# Patient Record
Sex: MALE | Race: BLACK OR AFRICAN AMERICAN | NOT HISPANIC OR LATINO | Employment: FULL TIME | URBAN - METROPOLITAN AREA
[De-identification: names, ages, dates, MRNs, and addresses within clinical notes are randomized per-mention and may not be internally consistent; named-entity substitution may affect disease eponyms.]

---

## 2017-01-06 ENCOUNTER — OFFICE VISIT (OUTPATIENT)
Dept: URGENT CARE | Facility: URGENT CARE | Age: 42
End: 2017-01-06
Payer: COMMERCIAL

## 2017-01-06 VITALS
OXYGEN SATURATION: 97 % | HEIGHT: 76 IN | DIASTOLIC BLOOD PRESSURE: 68 MMHG | HEART RATE: 78 BPM | WEIGHT: 202.6 LBS | TEMPERATURE: 98.2 F | BODY MASS INDEX: 24.67 KG/M2 | SYSTOLIC BLOOD PRESSURE: 104 MMHG

## 2017-01-06 DIAGNOSIS — H10.33 ACUTE CONJUNCTIVITIS OF BOTH EYES, UNSPECIFIED ACUTE CONJUNCTIVITIS TYPE: ICD-10-CM

## 2017-01-06 DIAGNOSIS — J06.9 UPPER RESPIRATORY TRACT INFECTION, UNSPECIFIED TYPE: Primary | ICD-10-CM

## 2017-01-06 PROCEDURE — 99213 OFFICE O/P EST LOW 20 MIN: CPT | Performed by: PHYSICIAN ASSISTANT

## 2017-01-06 RX ORDER — TOBRAMYCIN 3 MG/ML
1 SOLUTION/ DROPS OPHTHALMIC EVERY 4 HOURS
Qty: 1 BOTTLE | Refills: 0 | Status: SHIPPED | OUTPATIENT
Start: 2017-01-06 | End: 2017-01-13

## 2017-01-06 NOTE — PROGRESS NOTES
"SUBJECTIVE:   Sandra Cates is a 41 year old male presenting with a chief complaint of rhinorrhea.  Onset of symptoms was 2 day(s) ago.  Course of illness is same.    Severity mild  Current and Associated symptoms: eye redness and mattering  Treatment measures tried include none.  Predisposing factors include none.    Past Medical History   Diagnosis Date     Lactose intolerance      Thyroid disease         Allergies   Allergen Reactions     No Known Allergies      Milk Protein Extract Hives and Rash         Social History   Substance Use Topics     Smoking status: Current Every Day Smoker     Smokeless tobacco: Never Used      Comment: 1/2015 using Chantix, last cigarette 1st week of January 2015     Alcohol Use: No       ROS:  CONSTITUTIONAL:NEGATIVE for fever, chills, change in weight  INTEGUMENTARY/SKIN: NEGATIVE for worrisome rashes, moles or lesions  EYE: Positive for eye redness and mattering  ENT/MOUTH: POSITIVE for runny nose  RESP:NEGATIVE for significant cough or SOB  CV: NEGATIVE for chest pain, palpitations or peripheral edema  NEURO: NEGATIVE for weakness, dizziness or paresthesias    OBJECTIVE  :/68 mmHg  Pulse 78  Temp(Src) 98.2  F (36.8  C) (Oral)  Ht 6' 4\" (1.93 m)  Wt 202 lb 9.6 oz (91.899 kg)  BMI 24.67 kg/m2  SpO2 97%  GENERAL APPEARANCE: healthy, alert and no distress  EYES: Positive for bilateral eye redness with mild mattering  HENT: TM's normal bilaterally and rhinorrhea clear  NECK: supple, nontender, no lymphadenopathy  RESP: lungs clear to auscultation - no rales, rhonchi or wheezes  CV: regular rates and rhythm, normal S1 S2, no murmur noted  NEURO: Normal strength and tone, sensory exam grossly normal,  normal speech and mentation  SKIN: no suspicious lesions or rashes    ASSESSMENT/PLAN:      ICD-10-CM    1. Upper respiratory tract infection, unspecified type J06.9 tobramycin (TOBREX) 0.3 % ophthalmic solution   2. Acute conjunctivitis of both eyes, unspecified acute " conjunctivitis type H10.33 tobramycin (TOBREX) 0.3 % ophthalmic solution     Wash hands  Follow up with PCP as needed

## 2017-01-06 NOTE — NURSING NOTE
"Chief Complaint   Patient presents with     Conjunctivitis     both eyes, pink, crust on the eyes 1x days        Initial /68 mmHg  Pulse 78  Temp(Src) 98.2  F (36.8  C) (Oral)  Ht 6' 4\" (1.93 m)  Wt 202 lb 9.6 oz (91.899 kg)  BMI 24.67 kg/m2  SpO2 97% Estimated body mass index is 24.67 kg/(m^2) as calculated from the following:    Height as of this encounter: 6' 4\" (1.93 m).    Weight as of this encounter: 202 lb 9.6 oz (91.899 kg).  BP completed using cuff size: large      "

## 2017-03-26 DIAGNOSIS — E06.3 HASHIMOTO'S THYROIDITIS: ICD-10-CM

## 2017-03-27 RX ORDER — LEVOTHYROXINE SODIUM 88 UG/1
TABLET ORAL
Qty: 90 TABLET | Refills: 1 | Status: SHIPPED | OUTPATIENT
Start: 2017-03-27 | End: 2017-08-28

## 2017-03-27 NOTE — TELEPHONE ENCOUNTER
Prescription approved per [facility] Refill Protocol.  Noelle Ruano RN

## 2017-03-27 NOTE — TELEPHONE ENCOUNTER
levothyroxine (SYNTHROID/LEVOTHROID) 88 MCG tablet       Last Written Prescription Date: 8/1/2016  Last Quantity: 90, # refills: 1  Last Office Visit with [facility], [facility] or [facility] prescribing provider: 8/1/2016        TSH   Date Value Ref Range Status   08/01/2016 0.56 0.40 - 4.00 mU/L Final

## 2017-04-25 ENCOUNTER — OFFICE VISIT (OUTPATIENT)
Dept: FAMILY MEDICINE | Facility: CLINIC | Age: 42
End: 2017-04-25
Payer: COMMERCIAL

## 2017-04-25 VITALS
TEMPERATURE: 98.9 F | WEIGHT: 214.6 LBS | HEIGHT: 76 IN | SYSTOLIC BLOOD PRESSURE: 127 MMHG | BODY MASS INDEX: 26.13 KG/M2 | DIASTOLIC BLOOD PRESSURE: 71 MMHG | HEART RATE: 73 BPM | OXYGEN SATURATION: 97 %

## 2017-04-25 DIAGNOSIS — M54.16 ACUTE RADICULAR LOW BACK PAIN: Primary | ICD-10-CM

## 2017-04-25 DIAGNOSIS — R10.13 DYSPEPSIA: ICD-10-CM

## 2017-04-25 PROCEDURE — 99214 OFFICE O/P EST MOD 30 MIN: CPT | Performed by: INTERNAL MEDICINE

## 2017-04-25 RX ORDER — PREDNISONE 20 MG/1
20 TABLET ORAL DAILY
Qty: 5 TABLET | Refills: 0 | Status: SHIPPED | OUTPATIENT
Start: 2017-04-25 | End: 2017-08-28

## 2017-04-25 RX ORDER — TIZANIDINE 2 MG/1
2 TABLET ORAL 3 TIMES DAILY PRN
Qty: 30 TABLET | Refills: 1 | Status: SHIPPED | OUTPATIENT
Start: 2017-04-25 | End: 2017-08-28

## 2017-04-25 RX ORDER — NABUMETONE 500 MG/1
500 TABLET, FILM COATED ORAL 2 TIMES DAILY PRN
Qty: 30 TABLET | Refills: 1 | Status: SHIPPED | OUTPATIENT
Start: 2017-04-25 | End: 2017-08-28

## 2017-04-25 NOTE — MR AVS SNAPSHOT
After Visit Summary   4/25/2017    Sandra Cates    MRN: [id number]           Patient Information     Date Of Birth          1975        Visit Information        Provider Department      4/25/2017 3:30 PM Ericka Ortega MD [facility]        Today's Diagnoses     Acute radicular low back pain    -  1    Dyspepsia          Care Instructions    discontinue ibuprofen  Start taking nabumetone 500 mg twice daily with food  Take prednisone 20 mg daily for 5 days  Zantac 150 mg twice daily while you are on these medication to protect your stomach  Please call [place] radiology to schedule your MRI of back  [phone]  Seek sooner medical attention if there is any worsening of symptoms or problems.            Follow-ups after your visit        Future tests that were ordered for you today     Open Future Orders        Priority Expected Expires Ordered    MR Lumbar Spine w/o Contrast Routine  4/25/2018 4/25/2017            Who to contact     If you have questions or need follow up information about today's clinic visit or your schedule please contact [facility] directly at [phone].  Normal or non-critical lab and imaging results will be communicated to you by [employer], letter or phone within 4 business days after the clinic has received the results. If you do not hear from us within 7 days, please contact the clinic through [employer] or phone. If you have a critical or abnormal lab result, we will notify you by phone as soon as possible.  Submit refill requests through [employer] or call your pharmacy and they will forward the refill request to us. Please allow 3 business days for your refill to be completed.          Additional Information About Your Visit        [employer] Information     [employer] gives you secure access to your electronic health record. If you see a primary care provider, you can also send messages to your care team and make appointments. If you have  "questions, please call your primary care clinic.  If you do not have a primary care provider, please call [phone] and they will assist you.        Care EveryWhere ID     This is your Care EveryWhere ID. This could be used by other organizations to access your [place] medical records  [id number]        Your Vitals Were     Pulse Temperature Height Pulse Oximetry BMI (Body Mass Index)       73 98.9  F (37.2  C) (Oral) 6' 4\" (1.93 m) 97% 26.12 kg/m2        Blood Pressure from Last 3 Encounters:   04/25/17 127/71   01/06/17 104/68   08/01/16 124/72    Weight from Last 3 Encounters:   04/25/17 214 lb 9.6 oz (97.3 kg)   01/06/17 202 lb 9.6 oz (91.9 kg)   08/01/16 196 lb (88.9 kg)                 Today's Medication Changes          These changes are accurate as of: 4/25/17  3:59 PM.  If you have any questions, ask your nurse or doctor.               Start taking these medicines.        Dose/Directions    nabumetone 500 MG tablet   Commonly known as:  RELAFEN   Used for:  Acute radicular low back pain   Started by:  Ericka Ortega MD        Dose:  500 mg   Take 1 tablet (500 mg) by mouth 2 times daily as needed for moderate pain   Quantity:  30 tablet   Refills:  1       predniSONE 20 MG tablet   Commonly known as:  DELTASONE   Used for:  Acute radicular low back pain   Started by:  Ericka Ortega MD        Dose:  20 mg   Take 1 tablet (20 mg) by mouth daily   Quantity:  5 tablet   Refills:  0       tiZANidine 2 MG tablet   Commonly known as:  ZANAFLEX   Used for:  Acute radicular low back pain   Started by:  Ericka Ortega MD        Dose:  2 mg   Take 1 tablet (2 mg) by mouth 3 times daily as needed for muscle spasms   Quantity:  30 tablet   Refills:  1         These medicines have changed or have updated prescriptions.        Dose/Directions    * ranitidine 150 MG tablet   Commonly known as:  ZANTAC   This may have changed:  Another medication with the same name was added. Make sure you understand how and " when to take each.   Used for:  Dyspepsia   Changed by:  Ericka Ortega MD        Dose:  150 mg   Take 1 tablet (150 mg) by mouth 2 times daily   Quantity:  180 tablet   Refills:  1       * ranitidine 150 MG tablet   Commonly known as:  ZANTAC   This may have changed:  You were already taking a medication with the same name, and this prescription was added. Make sure you understand how and when to take each.   Used for:  Dyspepsia   Changed by:  Ericka Ortega MD        Dose:  150 mg   Take 1 tablet (150 mg) by mouth 2 times daily   Quantity:  30 tablet   Refills:  1       * Notice:  This list has 2 medication(s) that are the same as other medications prescribed for you. Read the directions carefully, and ask your doctor or other care provider to review them with you.         Where to get your medicines      These medications were sent to [facility]/pharmacy #[id number] [place], [place] - [address]  [address] [address]     Phone:  [phone]     nabumetone 500 MG tablet    predniSONE 20 MG tablet    ranitidine 150 MG tablet    tiZANidine 2 MG tablet                Primary Care Provider Office Phone # Fax #    Ericka Ortega [phone]       [place] [address] [address] [address]        Thank you!     Thank you for choosing [facility]  for your care. Our goal is always to provide you with excellent care. Hearing back from our patients is one way we can continue to improve our services. Please take a few minutes to complete the written survey that you may receive in the mail after your visit with us. Thank you!             Your Updated Medication List - Protect others around you: Learn how to safely use, store and throw away your medicines at www.disposemymeds.org.          This list is accurate as of: 4/25/17  3:59 PM.  Always use your most recent med list.                   Brand Name Dispense Instructions for use     levothyroxine 88 MCG tablet    SYNTHROID/LEVOTHROID    90 tablet    TAKE 1 TABLET (88 MCG) BY MOUTH DAILY       nabumetone 500 MG tablet    RELAFEN    30 tablet    Take 1 tablet (500 mg) by mouth 2 times daily as needed for moderate pain       predniSONE 20 MG tablet    DELTASONE    5 tablet    Take 1 tablet (20 mg) by mouth daily       * ranitidine 150 MG tablet    ZANTAC    180 tablet    Take 1 tablet (150 mg) by mouth 2 times daily       * ranitidine 150 MG tablet    ZANTAC    30 tablet    Take 1 tablet (150 mg) by mouth 2 times daily       tiZANidine 2 MG tablet    ZANAFLEX    30 tablet    Take 1 tablet (2 mg) by mouth 3 times daily as needed for muscle spasms       * varenicline 1 MG tablet    CHANTIX    56 tablet    Take 1 tablet (1 mg) by mouth 2 times daily As maintenance after finishing starter roderick       * varenicline 0.5 MG X 11 & 1 MG X 42 tablet    CHANTIX STARTING MONTH RODERICK    53 tablet    Take 0.5 mg tab daily for 3 days, then 0.5 mg tab twice daily for 4 days, then 1 mg twice daily.       * Notice:  This list has 4 medication(s) that are the same as other medications prescribed for you. Read the directions carefully, and ask your doctor or other care provider to review them with you.

## 2017-04-25 NOTE — NURSING NOTE
"Chief Complaint   Patient presents with     Musculoskeletal Problem       Initial /71 (BP Location: Left arm, Patient Position: Chair, Cuff Size: Adult Large)  Pulse 73  Temp 98.9  F (37.2  C) (Oral)  Ht 6' 4\" (1.93 m)  Wt 214 lb 9.6 oz (97.3 kg)  SpO2 97%  BMI 26.12 kg/m2 Estimated body mass index is 26.12 kg/(m^2) as calculated from the following:    Height as of this encounter: 6' 4\" (1.93 m).    Weight as of this encounter: 214 lb 9.6 oz (97.3 kg).  Medication Reconciliation: complete   Rosalee Lr MA  "

## 2017-04-25 NOTE — PATIENT INSTRUCTIONS
discontinue ibuprofen  Start taking nabumetone 500 mg twice daily with food  Take prednisone 20 mg daily for 5 days  Zantac 150 mg twice daily while you are on these medication to protect your stomach  Please call [place] radiology to schedule your MRI of back  [phone]  Seek sooner medical attention if there is any worsening of symptoms or problems.  Follow up in one week
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
## 2017-04-27 ENCOUNTER — HOSPITAL ENCOUNTER (OUTPATIENT)
Dept: MRI IMAGING | Facility: CLINIC | Age: 42
Discharge: HOME | End: 2017-04-27
Attending: INTERNAL MEDICINE | Admitting: INTERNAL MEDICINE
Payer: COMMERCIAL

## 2017-04-27 DIAGNOSIS — M54.16 ACUTE RADICULAR LOW BACK PAIN: ICD-10-CM

## 2017-04-27 PROCEDURE — 72148 MRI LUMBAR SPINE W/O DYE: CPT

## 2017-04-27 NOTE — LETTER
4/28/2017     Sandra Birminghamu  [address]  [facility] [address]      Dear Sandra:    This is to inform you regarding your test result.    I spoke to you on phone but sending you a result note also.  Your MRI shows:  IMPRESSION: L4-L5 mild or early degenerative disc disease, diffuse  annular bulge or broad-based disc protrusion, and mild bilateral  foraminal stenosis    I have referred you to spine specialist   Please call the following number to make appointment :  All areas ~ [phone]    Dr.Nasima Shannon MD,FACP/Naomi DO, CMA

## 2017-04-28 ENCOUNTER — TELEPHONE (OUTPATIENT)
Dept: FAMILY MEDICINE | Facility: CLINIC | Age: 42
End: 2017-04-28

## 2017-04-28 DIAGNOSIS — M54.10 RADICULAR LOW BACK PAIN: Primary | ICD-10-CM

## 2017-04-28 NOTE — TELEPHONE ENCOUNTER
Spoke to  Patient and informed him with result of MRI   Referred to spine specialist  Phone number is provided.  He agreed to see spine specialist.  Dr.Nasima Shannon MD

## 2017-04-28 NOTE — PROGRESS NOTES
This is to inform you regarding your test result.    I spoke to you on phone but sending you a result note also.  Your MRI shows:  IMPRESSION: L4-L5 mild or early degenerative disc disease, diffuse  annular bulge or broad-based disc protrusion, and mild bilateral  foraminal stenosis    I have referred you to spine specialist   Please call the following number to make appointment :  All areas ~ [phone]    Dr.Nasima Shannon MD,FACP

## 2017-04-28 NOTE — TELEPHONE ENCOUNTER
Reason for Call:  Request for results:    Name of test or procedure: MRI    Date of test of procedure: 4/27/2017    Location of the test or procedure: [facility]    OK to leave the result message on voice mail or with a family member? YES    Phone number Patient can be reached at:  Home number on file [phone] (home)    Additional comments: Today please    Call taken on 4/28/2017 at 2:29 PM by Niki Jorge

## 2017-05-03 ENCOUNTER — OFFICE VISIT (OUTPATIENT)
Dept: NEUROSURGERY | Facility: CLINIC | Age: 42
End: 2017-05-03
Attending: PHYSICIAN ASSISTANT
Payer: COMMERCIAL

## 2017-05-03 VITALS
SYSTOLIC BLOOD PRESSURE: 137 MMHG | OXYGEN SATURATION: 98 % | HEIGHT: 77 IN | WEIGHT: 209 LBS | TEMPERATURE: 98.1 F | DIASTOLIC BLOOD PRESSURE: 89 MMHG | BODY MASS INDEX: 24.68 KG/M2 | HEART RATE: 73 BPM

## 2017-05-03 DIAGNOSIS — M54.16 LUMBAR RADICULOPATHY: Primary | ICD-10-CM

## 2017-05-03 PROCEDURE — 99211 OFF/OP EST MAY X REQ PHY/QHP: CPT | Performed by: PHYSICIAN ASSISTANT

## 2017-05-03 PROCEDURE — 99203 OFFICE O/P NEW LOW 30 MIN: CPT | Performed by: PHYSICIAN ASSISTANT

## 2017-05-03 NOTE — NURSING NOTE
"Sandra Cates is a 41 year old male who presents for:  Chief Complaint   Patient presents with     Neurologic Problem     referral from Dr. Ortega for lumbar radiculopathy        Initial Vitals:  There were no vitals taken for this visit. Estimated body mass index is 26.12 kg/(m^2) as calculated from the following:    Height as of 4/25/17: 6' 4\" (1.93 m).    Weight as of 4/25/17: 214 lb 9.6 oz (97.3 kg).. There is no height or weight on file to calculate BSA. BP completed using cuff size: regular  Data Unavailable    Do you feel safe in your environment?  Yes  Do you need any refills today? No    Nursing Comments: referral from Dr. Ortega for lumbar radiculopathy.  Patient rates his pain today as mid to low back and hips      5 min. nursing intake time  Nickie Mae CMA, AAS      Discharge plan:   See providers dictation  2 min. nursing discharge time  Nickie Mae CMA, AAS       "

## 2017-05-03 NOTE — PATIENT INSTRUCTIONS
Physical therapy and steroid injection ordered - they will contact you within 48 hours to schedule

## 2017-05-03 NOTE — MR AVS SNAPSHOT
After Visit Summary   5/3/2017    Sandra Cates    MRN: [id number]           Patient Information     Date Of Birth          1975        Visit Information        Provider Department      5/3/2017 11:25 AM Lidia Mcleod PA-C [facility] Neurosurgery [facility]        Today's Diagnoses     Lumbar radiculopathy    -  1      Care Instructions    Physical therapy and steroid injection ordered - they will contact you within 48 hours to schedule        Follow-ups after your visit        Additional Services     [facility] PT, HAND, AND CHIROPRACTIC REFERRAL       **This order will print in the [facility] Scheduling Office**    Physical Therapy, Hand Therapy and Chiropractic Care are available through:    *[facility]  *[facility]  *[facility]    Call one number to schedule at any of the above locations: [phone].    Your provider has referred you to: Physical Therapy at [facility] or [facility]    Indication/Reason for Referral: Low Back Pain  Onset of Illness: chronic  Therapy Orders: Evaluate and Treat  Special Programs: None  Special Request: None    Natalee Albright      Additional Comments for the Therapist or Chiropractor:     Please be aware that coverage of these services is subject to the terms and limitations of your health insurance plan.  Call member services at your health plan with any benefit or coverage questions.      Please bring the following to your appointment:    *Your personal calendar for scheduling future appointments  *Comfortable clothing                  Future tests that were ordered for you today     Open Future Orders        Priority Expected Expires Ordered    XR Lumbar Epidural Injection Incl Imaging Routine 5/3/2017 5/3/2018 5/3/2017            Who to contact     If you have questions or need follow up information about today's clinic visit or your schedule please contact [facility]UROSURG[facility] directly at  "[phone].  Normal or non-critical lab and imaging results will be communicated to you by MyChart, letter or phone within 4 business days after the clinic has received the results. If you do not hear from us within 7 days, please contact the clinic through [employer] or phone. If you have a critical or abnormal lab result, we will notify you by phone as soon as possible.  Submit refill requests through [employer] or call your pharmacy and they will forward the refill request to us. Please allow 3 business days for your refill to be completed.          Additional Information About Your Visit        [employer] Information     [employer] gives you secure access to your electronic health record. If you see a primary care provider, you can also send messages to your care team and make appointments. If you have questions, please call your primary care clinic.  If you do not have a primary care provider, please call [phone] and they will assist you.        Care EveryWhere ID     This is your Care EveryWhere ID. This could be used by other organizations to access your [facility] medical records  [id number]        Your Vitals Were     Pulse Temperature Height Pulse Oximetry BMI (Body Mass Index)       73 98.1  F (36.7  C) (Oral) 6' 5\" (1.956 m) 98% 24.78 kg/m2        Blood Pressure from Last 3 Encounters:   05/03/17 137/89   04/25/17 127/71   01/06/17 104/68    Weight from Last 3 Encounters:   05/03/17 209 lb (94.8 kg)   04/25/17 214 lb 9.6 oz (97.3 kg)   01/06/17 202 lb 9.6 oz (91.9 kg)              We Performed the Following     [facility] PT, HAND, AND CHIROPRACTIC REFERRAL        Primary Care Provider Office Phone # Fax #    Ericka Ortega [phone]       [facility]    [address]  [facility] [address]        Thank you!     Thank you for choosing [facility]  for your care. Our goal is always to provide you with excellent care. Hearing back from our " patients is one way we can continue to improve our services. Please take a few minutes to complete the written survey that you may receive in the mail after your visit with us. Thank you!             Your Updated Medication List - Protect others around you: Learn how to safely use, store and throw away your medicines at www.disposemymeds.org.          This list is accurate as of: 5/3/17 11:49 AM.  Always use your most recent med list.                   Brand Name Dispense Instructions for use    levothyroxine 88 MCG tablet    SYNTHROID/LEVOTHROID    90 tablet    TAKE 1 TABLET (88 MCG) BY MOUTH DAILY       nabumetone 500 MG tablet    RELAFEN    30 tablet    Take 1 tablet (500 mg) by mouth 2 times daily as needed for moderate pain       predniSONE 20 MG tablet    DELTASONE    5 tablet    Take 1 tablet (20 mg) by mouth daily       ranitidine 150 MG tablet    ZANTAC    30 tablet    Take 1 tablet (150 mg) by mouth 2 times daily       tiZANidine 2 MG tablet    ZANAFLEX    30 tablet    Take 1 tablet (2 mg) by mouth 3 times daily as needed for muscle spasms       * varenicline 1 MG tablet    CHANTIX    56 tablet    Take 1 tablet (1 mg) by mouth 2 times daily As maintenance after finishing starter roderick       * varenicline 0.5 MG X 11 & 1 MG X 42 tablet    CHANTIX STARTING MONTH RODERICK    53 tablet    Take 0.5 mg tab daily for 3 days, then 0.5 mg tab twice daily for 4 days, then 1 mg twice daily.       * Notice:  This list has 2 medication(s) that are the same as other medications prescribed for you. Read the directions carefully, and ask your doctor or other care provider to review them with you.

## 2017-08-28 ENCOUNTER — OFFICE VISIT (OUTPATIENT)
Dept: FAMILY MEDICINE | Facility: CLINIC | Age: 42
End: 2017-08-28
Payer: COMMERCIAL

## 2017-08-28 VITALS
OXYGEN SATURATION: 98 % | HEART RATE: 66 BPM | HEIGHT: 77 IN | WEIGHT: 215.4 LBS | TEMPERATURE: 97.7 F | DIASTOLIC BLOOD PRESSURE: 64 MMHG | BODY MASS INDEX: 25.43 KG/M2 | SYSTOLIC BLOOD PRESSURE: 127 MMHG

## 2017-08-28 DIAGNOSIS — Z00.00 ROUTINE HISTORY AND PHYSICAL EXAMINATION OF ADULT: Primary | ICD-10-CM

## 2017-08-28 DIAGNOSIS — E06.3 HASHIMOTO'S THYROIDITIS: ICD-10-CM

## 2017-08-28 DIAGNOSIS — R14.3 FLATULENCE, ERUCTATION, AND GAS PAIN: ICD-10-CM

## 2017-08-28 DIAGNOSIS — R14.1 FLATULENCE, ERUCTATION, AND GAS PAIN: ICD-10-CM

## 2017-08-28 DIAGNOSIS — R14.2 FLATULENCE, ERUCTATION, AND GAS PAIN: ICD-10-CM

## 2017-08-28 DIAGNOSIS — L98.9 SKIN LESION: ICD-10-CM

## 2017-08-28 DIAGNOSIS — R10.13 DYSPEPSIA: ICD-10-CM

## 2017-08-28 DIAGNOSIS — Z12.5 PROSTATE CANCER SCREENING: ICD-10-CM

## 2017-08-28 LAB
ERYTHROCYTE [DISTWIDTH] IN BLOOD BY AUTOMATED COUNT: 12.8 % (ref 10–15)
HCT VFR BLD AUTO: 40.1 % (ref 40–53)
HGB BLD-MCNC: 13.5 G/DL (ref 13.3–17.7)
MCH RBC QN AUTO: 30 PG (ref 26.5–33)
MCHC RBC AUTO-ENTMCNC: 33.7 G/DL (ref 31.5–36.5)
MCV RBC AUTO: 89 FL (ref 78–100)
PLATELET # BLD AUTO: 150 10E9/L (ref 150–450)
RBC # BLD AUTO: 4.5 10E12/L (ref 4.4–5.9)
WBC # BLD AUTO: 5.4 10E9/L (ref 4–11)

## 2017-08-28 PROCEDURE — 36415 COLL VENOUS BLD VENIPUNCTURE: CPT | Performed by: INTERNAL MEDICINE

## 2017-08-28 PROCEDURE — 99396 PREV VISIT EST AGE 40-64: CPT | Performed by: INTERNAL MEDICINE

## 2017-08-28 PROCEDURE — 80061 LIPID PANEL: CPT | Performed by: INTERNAL MEDICINE

## 2017-08-28 PROCEDURE — 80053 COMPREHEN METABOLIC PANEL: CPT | Performed by: INTERNAL MEDICINE

## 2017-08-28 PROCEDURE — G0103 PSA SCREENING: HCPCS | Performed by: INTERNAL MEDICINE

## 2017-08-28 PROCEDURE — 84443 ASSAY THYROID STIM HORMONE: CPT | Performed by: INTERNAL MEDICINE

## 2017-08-28 PROCEDURE — 83516 IMMUNOASSAY NONANTIBODY: CPT | Mod: 59 | Performed by: INTERNAL MEDICINE

## 2017-08-28 PROCEDURE — 83516 IMMUNOASSAY NONANTIBODY: CPT | Performed by: INTERNAL MEDICINE

## 2017-08-28 PROCEDURE — 85027 COMPLETE CBC AUTOMATED: CPT | Performed by: INTERNAL MEDICINE

## 2017-08-28 PROCEDURE — 84439 ASSAY OF FREE THYROXINE: CPT | Performed by: INTERNAL MEDICINE

## 2017-08-28 RX ORDER — LEVOTHYROXINE SODIUM 88 UG/1
88 TABLET ORAL DAILY
Qty: 90 TABLET | Refills: 1 | Status: SHIPPED | OUTPATIENT
Start: 2017-08-28 | End: 2017-08-30

## 2017-08-28 NOTE — NURSING NOTE
"Chief Complaint   Patient presents with     Physical        Initial /64 (BP Location: Left arm, Patient Position: Chair, Cuff Size: Adult Regular)  Pulse 66  Temp 97.7  F (36.5  C) (Tympanic)  Ht 6' 5\" (1.956 m)  Wt 215 lb 6.4 oz (97.7 kg)  SpO2 98%  BMI 25.54 kg/m2 Estimated body mass index is 25.54 kg/(m^2) as calculated from the following:    Height as of this encounter: 6' 5\" (1.956 m).    Weight as of this encounter: 215 lb 6.4 oz (97.7 kg)..    BP completed using cuff size: regular  MEDICATIONS REVIEWED  SOCIAL AND FAMILY HX REVIEWED  Brooklyn Washington CMA  "

## 2017-08-28 NOTE — PATIENT INSTRUCTIONS
Preventive Health Recommendations  Male Ages 40 to 49    Yearly exam:             See your health care provider every year in order to  o   Review health changes.   o   Discuss preventive care.    o   Review your medicines if your doctor has prescribed any.    You should be tested each year for STDs (sexually transmitted diseases) if you re at risk.     Have a cholesterol test every 5 years.     Have a colonoscopy (test for colon cancer) if someone in your family has had colon cancer or polyps before age 50.     After age 45, have a diabetes test (fasting glucose). If you are at risk for diabetes, you should have this test every 3 years.      Talk with your health care provider about whether or not a prostate cancer screening test (PSA) is right for you.    Shots: Get a flu shot each year. Get a tetanus shot every 10 years.     Nutrition:    Eat at least 5 servings of fruits and vegetables daily.     Eat whole-grain bread, whole-wheat pasta and brown rice instead of white grains and rice.     Talk to your provider about Calcium and Vitamin D.     Lifestyle    Exercise for at least 150 minutes a week (30 minutes a day, 5 days a week). This will help you control your weight and prevent disease.     Limit alcohol to one drink per day.     No smoking.     Wear sunscreen to prevent skin cancer.     See your dentist every six months for an exam and cleaning.     Labs today  Make appointment with GI and skin care clinic  Follow up in one year for physical   Seek sooner medical attention if there is any worsening of symptoms or problems.

## 2017-08-28 NOTE — MR AVS SNAPSHOT
After Visit Summary   8/28/2017    Sandra Cates    MRN: [id number]           Patient Information     Date Of Birth          1975        Visit Information        Provider Department      8/28/2017 4:00 PM Ericka Ortega MD [facility]        Today's Diagnoses     Routine history and physical examination of adult    -  1    Hashimoto's thyroiditis        Dyspepsia        Flatulence, eructation, and gas pain        Prostate cancer screening        Skin lesion          Care Instructions      Preventive Health Recommendations  Male Ages 40 to 49    Yearly exam:             See your health care provider every year in order to  o   Review health changes.   o   Discuss preventive care.    o   Review your medicines if your doctor has prescribed any.    You should be tested each year for STDs (sexually transmitted diseases) if you re at risk.     Have a cholesterol test every 5 years.     Have a colonoscopy (test for colon cancer) if someone in your family has had colon cancer or polyps before age 50.     After age 45, have a diabetes test (fasting glucose). If you are at risk for diabetes, you should have this test every 3 years.      Talk with your health care provider about whether or not a prostate cancer screening test (PSA) is right for you.    Shots: Get a flu shot each year. Get a tetanus shot every 10 years.     Nutrition:    Eat at least 5 servings of fruits and vegetables daily.     Eat whole-grain bread, whole-wheat pasta and brown rice instead of white grains and rice.     Talk to your provider about Calcium and Vitamin D.     Lifestyle    Exercise for at least 150 minutes a week (30 minutes a day, 5 days a week). This will help you control your weight and prevent disease.     Limit alcohol to one drink per day.     No smoking.     Wear sunscreen to prevent skin cancer.     See your dentist every six months for an exam and cleaning.     Labs today  Make appointment with GI  and skin care clinic  Follow up in one year for physical   Seek sooner medical attention if there is any worsening of symptoms or problems.               Follow-ups after your visit        Additional Services     GASTROENTEROLOGY ADULT REF CONSULT ONLY       Preferred Location: [facility] [phone]      Please be aware that coverage of these services is subject to the terms and limitations of your health insurance plan.  Call member services at your health plan with any benefit or coverage questions.  Any procedures must be performed at a [facility] facility OR coordinated by your clinic's referral office.    Please bring the following with you to your appointment:    (1) Any X-Rays, CTs or MRIs which have been performed.  Contact the facility where they were done to arrange for  prior to your scheduled appointment.    (2) List of current medications   (3) This referral request   (4) Any documents/labs given to you for this referral            SKIN CARE REFERRAL       Your provider has referred you to: [facility]: [facility] [place] [phone]   [email or web address]     Please be aware that coverage of these services is subject to the terms and limitations of your health insurance plan.  Please check with your insurance prior to the appointment to ensure appropriate coverage for any services considered cosmetic in nature or not medically necessary.    Please bring the following with you to your appointment:    (1) Any X-Rays, CTs or MRIs which have been performed.  Contact the facility where they were done to arrange for  prior to your scheduled appointment.  Any new CT, MRI or other procedures ordered by your specialist must be performed at a [facility] facility or coordinated by your clinic's referral office.  (2) List of current medications  (3) This referral request   (4) Any documents/labs given to you for this referral                  Who to  "contact     If you have questions or need follow up information about today's clinic visit or your schedule please contact [facility] directly at [phone].  Normal or non-critical lab and imaging results will be communicated to you by MyChart, letter or phone within 4 business days after the clinic has received the results. If you do not hear from us within 7 days, please contact the clinic through [employer] or phone. If you have a critical or abnormal lab result, we will notify you by phone as soon as possible.  Submit refill requests through [employer] or call your pharmacy and they will forward the refill request to us. Please allow 3 business days for your refill to be completed.          Additional Information About Your Visit        [employer] Information     [employer] gives you secure access to your electronic health record. If you see a primary care provider, you can also send messages to your care team and make appointments. If you have questions, please call your primary care clinic.  If you do not have a primary care provider, please call [phone] and they will assist you.        Care EveryWhere ID     This is your Care EveryWhere ID. This could be used by other organizations to access your [facility] medical records  [id number]        Your Vitals Were     Pulse Temperature Height Pulse Oximetry BMI (Body Mass Index)       66 97.7  F (36.5  C) (Tympanic) 6' 5\" (1.956 m) 98% 25.54 kg/m2        Blood Pressure from Last 3 Encounters:   08/28/17 127/64   05/08/17 141/77   05/03/17 137/89    Weight from Last 3 Encounters:   08/28/17 215 lb 6.4 oz (97.7 kg)   05/03/17 209 lb (94.8 kg)   04/25/17 214 lb 9.6 oz (97.3 kg)              We Performed the Following     CBC with platelets     Comprehensive metabolic panel     GASTROENTEROLOGY ADULT REF CONSULT ONLY     Lipid panel reflex to direct LDL     PSA, screen     SKIN CARE REFERRAL     Tissue transglutaminase torsten IgA and IgG     TSH with free T4 " reflex          Today's Medication Changes          These changes are accurate as of: 8/28/17  4:39 PM.  If you have any questions, ask your nurse or doctor.               These medicines have changed or have updated prescriptions.        Dose/Directions    levothyroxine 88 MCG tablet   Commonly known as:  SYNTHROID/LEVOTHROID   This may have changed:  See the new instructions.   Used for:  Hashimoto's thyroiditis   Changed by:  Ericka Ortega MD        Dose:  88 mcg   Take 1 tablet (88 mcg) by mouth daily   Quantity:  90 tablet   Refills:  1            Where to get your medicines      These medications were sent to [facility]/pharmacy #[id number] - [facility] [address] [facility]  [address] [place] [address]     Phone:  [phone]     levothyroxine 88 MCG tablet                Primary Care Provider Office Phone # Fax #    Ericka Ortega [phone] [address] [address]        Equal Access to Services     [facility] AH: Hadii aad ku hadasho Sojenise, waaxda luqadaha, qaybta kaalmada adeegyada, waxay idiin hayaan tolu bahena . So [facility] [phone].    ATENCIÓN: Si habla español, tiene a fuller disposición servicios gratuitos de asistencia lingüística. Llame al [phone].    We comply with applicable federal civil rights laws and [place] laws. We do not discriminate on the basis of race, color, national origin, age, disability sex, sexual orientation or gender identity.            Thank you!     Thank you for choosing [facility]  for your care. Our goal is always to provide you with excellent care. Hearing back from our patients is one way we can continue to improve our services. Please take a few minutes to complete the written survey that you may receive in the mail after your visit with us. Thank you!             Your Updated Medication List - Protect others around you: Learn how to safely use, store and throw away your  medicines at www.disposemymeds.org.          This list is accurate as of: 8/28/17  4:39 PM.  Always use your most recent med list.                   Brand Name Dispense Instructions for use Diagnosis    levothyroxine 88 MCG tablet    SYNTHROID/LEVOTHROID    90 tablet    Take 1 tablet (88 mcg) by mouth daily    Hashimoto's thyroiditis       varenicline 1 MG tablet    CHANTIX    56 tablet    Take 1 tablet (1 mg) by mouth 2 times daily As maintenance after finishing starter shakeel    Tobacco use disorder

## 2017-08-28 NOTE — PROGRESS NOTES
SUBJECTIVE:   CC: Sandra Cates is an 41 year old male who presents for preventative health visit.     Healthy Habits:    Do you get at least three servings of calcium containing foods daily (dairy, green leafy vegetables, etc.)? yes    Amount of exercise or daily activities, outside of work: 2 day(s) per week    Problems taking medications regularly No    Medication side effects: No    Have you had an eye exam in the past two years? no    Do you see a dentist twice per year? no    Do you have sleep apnea, excessive snoring or daytime drowsiness?no        Today's PHQ-2 Score:   PHQ-2 ( 1999 [employer]) 8/28/2017 4/25/2017   Q1: Little interest or pleasure in doing things 0 0   Q2: Feeling down, depressed or hopeless 0 0   PHQ-2 Score 0 0     Pt says he experiences abdominal pain at least 3 days a week along with bloating-feels like he needs to keep Pepto-Bismol wherever he goes due to the frequency and severity   Reports that his abdominal pain has been going on for a very long time and he would like to see a GI specialist-pt took Zantac in the past, but he stopped taking it when he did not notice a significant relief of his symptoms  Pt no longer eats eggs, milk, and some sauces, but he still experiences symptoms with this  Says he does not have symptoms related to the prostate  Pt would like to be referred to skin doctor       Abuse: Current or Past(Physical, Sexual or Emotional)- No  Do you feel safe in your environment - Yes  Social History   Substance Use Topics     Smoking status: Former Smoker     Smokeless tobacco: Never Used      Comment: 1/2015 using Chantix, last cigarette 1st week of January 2015     Alcohol use No     The patient does not drink >3 drinks per day nor >7 drinks per week.    Last PSA:   PSA   Date Value Ref Range Status   08/01/2016 1.06 0 - 4 ug/L Final     Comment:     Assay Method:  Chemiluminescence using Siemens Vista analyzer       Reviewed orders with patient. Reviewed health  maintenance and updated orders accordingly - Yes  Labs reviewed in EPIC  Patient Active Problem List   Diagnosis     CARDIOVASCULAR SCREENING; LDL GOAL LESS THAN 160     Lactose intolerance     Elevated liver enzymes     Hashimoto's thyroiditis     FH: prostate cancer     Tear of medial cartilage or meniscus of knee, current     Anti-TPO antibodies present     Hypothyroidism, unspecified hypothyroidism type     No past surgical history on file.    Social History   Substance Use Topics     Smoking status: Former Smoker     Smokeless tobacco: Never Used      Comment: 1/2015 using Chantix, last cigarette 1st week of January 2015     Alcohol use No     Family History   Problem Relation Age of Onset     Coronary Artery Disease Mother      Prostate Cancer Father          Current Outpatient Prescriptions   Medication Sig Dispense Refill     levothyroxine (SYNTHROID/LEVOTHROID) 88 MCG tablet Take 1 tablet (88 mcg) by mouth daily 90 tablet 1     varenicline (CHANTIX) 1 MG tablet Take 1 tablet (1 mg) by mouth 2 times daily As maintenance after finishing starter shakeel 56 tablet 2     [DISCONTINUED] levothyroxine (SYNTHROID/LEVOTHROID) 88 MCG tablet TAKE 1 TABLET (88 MCG) BY MOUTH DAILY 90 tablet 1     Allergies   Allergen Reactions     No Known Allergies      Milk Protein Extract Hives and Rash         Reviewed and updated as needed this visit by clinical staff  Tobacco  Allergies  Meds  Soc Hx        Reviewed and updated as needed this visit by Provider              ROS:  C: NEGATIVE for fever, chills, change in weight  I: NEGATIVE for worrisome rashes, moles or lesions  E: NEGATIVE for vision changes or irritation  ENT: NEGATIVE for ear, mouth and throat problems  R: NEGATIVE for significant cough or SOB  CV: NEGATIVE for chest pain, palpitations or peripheral edema  GI: see HPI   male: negative for dysuria, hematuria, decreased urinary stream, erectile dysfunction, urethral discharge  M: NEGATIVE for significant  "arthralgias or myalgia  N: NEGATIVE for weakness, dizziness or paresthesias  P: NEGATIVE for changes in mood or affect  Back pain improved after epidural steroid injection.  This document serves as a record of the services and decisions personally performed and made by Ericka Ortega MD. It was created on her behalf by Lisa Ivey, a trained medical scribe. The creation of this document is based the provider's statements to the medical scribe.    Scribe Lisa Ivey 5:04 PM, August 28, 2017      OBJECTIVE:   /64 (BP Location: Left arm, Patient Position: Chair, Cuff Size: Adult Regular)  Pulse 66  Temp 97.7  F (36.5  C) (Tympanic)  Ht 6' 5\" (1.956 m)  Wt 215 lb 6.4 oz (97.7 kg)  SpO2 98%  BMI 25.54 kg/m2  EXAM:  GENERAL: healthy, alert and no distress  EYES: Eyes grossly normal to inspection, PERRL and conjunctivae and sclerae normal  HENT: ear canals and TM's normal, nose and mouth without ulcers or lesions  NECK: no adenopathy, no asymmetry, masses, or scars and thyroid normal to palpation  RESP: lungs clear to auscultation - no rales, rhonchi or wheezes  CV: regular rate and rhythm, normal S1 S2, no S3 or S4, no murmur, click or rub, no peripheral edema and peripheral pulses strong  ABDOMEN: soft, nontender, no hepatosplenomegaly, no masses and bowel sounds normal  : Normal external genitalia without lesions  MS: no gross musculoskeletal defects noted, no edema  SKIN: pt has healing lesions on lower extremities from playing soccer, also has small skin lesion on right upper arm on lateral side  Like skin tag.  NEURO: Normal strength and tone, mentation intact and speech normal  PSYCH: mentation appears normal, affect normal/bright    ASSESSMENT/PLAN:     Sandra was seen today for physical.    Diagnoses and all orders for this visit:    Routine history and physical examination of adult  -     Lipid panel reflex to direct LDL  -     CBC with platelets    Hashimoto's thyroiditis  -     " "levothyroxine (SYNTHROID/LEVOTHROID) 88 MCG tablet; Take 1 tablet (88 mcg) by mouth daily  -     TSH with free T4 reflex  Educated pt about the importance of healthy diet and exercise     Dyspepsia  -     Comprehensive metabolic panel  -     GASTROENTEROLOGY ADULT REF CONSULT ONLY  -     Tissue transglutaminase torsten IgA and IgG  Continue taking Pepto-Bismol as needed and take zantac 150 mg twice daily.    Flatulence, eructation, and gas pain  -     Comprehensive metabolic panel  -     GASTROENTEROLOGY ADULT REF CONSULT ONLY  -     Tissue transglutaminase torsten IgA and IgG    Prostate cancer screening  -     PSA, screen    Skin lesion  Comments:  on right upper arm is irritating and comes under clothes  Orders:  -     SKIN CARE REFERRAL for removal of skin tag which is irritated.      See pt instructions    Patient Instructions     Labs today  Make appointment with GI and skin care clinic  Follow up in one year for physical   Seek sooner medical attention if there is any worsening of symptoms or problems.         COUNSELING:  Reviewed preventive health counseling, as reflected in patient instructions       Regular exercise       Healthy diet/nutrition       reports that he has quit smoking. He has never used smokeless tobacco.      Estimated body mass index is 25.54 kg/(m^2) as calculated from the following:    Height as of this encounter: 6' 5\" (1.956 m).    Weight as of this encounter: 215 lb 6.4 oz (97.7 kg).   Weight management plan: healthy diet and excercise    Counseling Resources:  ATP IV Guidelines  Pooled Cohorts Equation Calculator  FRAX Risk Assessment  ICSI Preventive Guidelines  Dietary Guidelines for Americans, 2010  USDA's MyPlate  ASA Prophylaxis  Lung CA Screening    The information in this document, created by the medical scribe for me, accurately reflects the services I personally performed and the decisions made by me. I have reviewed and approved this document for accuracy prior to leaving the " patient care area.  Ericka Ortega MD  5:03 PM, 08/28/17      Ericka Ortega MD  [facility]

## 2017-08-29 LAB
ALBUMIN SERPL-MCNC: 4 G/DL (ref 3.4–5)
ALP SERPL-CCNC: 67 U/L (ref 40–150)
ALT SERPL W P-5'-P-CCNC: 54 U/L (ref 0–70)
ANION GAP SERPL CALCULATED.3IONS-SCNC: 6 MMOL/L (ref 3–14)
AST SERPL W P-5'-P-CCNC: 45 U/L (ref 0–45)
BILIRUB SERPL-MCNC: 0.6 MG/DL (ref 0.2–1.3)
BUN SERPL-MCNC: 23 MG/DL (ref 7–30)
CALCIUM SERPL-MCNC: 8.9 MG/DL (ref 8.5–10.1)
CHLORIDE SERPL-SCNC: 105 MMOL/L (ref 94–109)
CHOLEST SERPL-MCNC: 186 MG/DL
CO2 SERPL-SCNC: 30 MMOL/L (ref 20–32)
CREAT SERPL-MCNC: 0.98 MG/DL (ref 0.66–1.25)
GFR SERPL CREATININE-BSD FRML MDRD: 84 ML/MIN/1.7M2
GLUCOSE SERPL-MCNC: 106 MG/DL (ref 70–99)
HDLC SERPL-MCNC: 34 MG/DL
LDLC SERPL CALC-MCNC: 116 MG/DL
NONHDLC SERPL-MCNC: 152 MG/DL
POTASSIUM SERPL-SCNC: 4 MMOL/L (ref 3.4–5.3)
PROT SERPL-MCNC: 7.2 G/DL (ref 6.8–8.8)
PSA SERPL-ACNC: 0.73 UG/L (ref 0–4)
SODIUM SERPL-SCNC: 141 MMOL/L (ref 133–144)
T4 FREE SERPL-MCNC: 0.7 NG/DL (ref 0.76–1.46)
TRIGL SERPL-MCNC: 180 MG/DL
TSH SERPL DL<=0.005 MIU/L-ACNC: 41.91 MU/L (ref 0.4–4)

## 2017-08-30 DIAGNOSIS — E06.3 HASHIMOTO'S THYROIDITIS: ICD-10-CM

## 2017-08-30 LAB
TTG IGA SER-ACNC: 1 U/ML
TTG IGG SER-ACNC: <1 U/ML

## 2017-08-30 RX ORDER — LEVOTHYROXINE SODIUM 112 UG/1
112 TABLET ORAL DAILY
Qty: 90 TABLET | Refills: 0 | Status: SHIPPED | OUTPATIENT
Start: 2017-08-30 | End: 2017-11-25

## 2017-08-30 NOTE — PROGRESS NOTES
Kelton Flores,    This is to inform you regarding your test result.    I spoke to you on phone but sending you a result note also.  TSH which is thyroid hormone is elevated   Increase levothyroxine to 112  mcg po daily  Recheck TSH in 2 months.  Orders are placed.  New script is sent  Make lab only appointment in 2 months for  checking TSH level  Your total cholesterol is normal.  The triglycerides are high. Lowering  the amount of sugar ,alcohol and sweets in the diet helps to control this.Exercise and weight loss helps.  HDL which is called good cholesterol is low.  Your LDL which is called bad cholesterol is elevated.  Eat low cholesterol low fat  diet and do regular physical activity. Avoid high sugar containing food.  The testing of your kidney function, liver function and electrolytes was satisfactory   CBC result which includes white count Hemoglobin and  Platelet Counts is normal.   Your PSA (prostate cancer screening test)  is normal.          Dr.Nasima Shannon MD,FACP

## 2017-09-10 ENCOUNTER — OFFICE VISIT (OUTPATIENT)
Dept: URGENT CARE | Facility: URGENT CARE | Age: 42
End: 2017-09-10
Payer: COMMERCIAL

## 2017-09-10 VITALS
WEIGHT: 213 LBS | HEART RATE: 59 BPM | TEMPERATURE: 97.6 F | BODY MASS INDEX: 25.26 KG/M2 | DIASTOLIC BLOOD PRESSURE: 72 MMHG | SYSTOLIC BLOOD PRESSURE: 143 MMHG

## 2017-09-10 DIAGNOSIS — L29.9 ITCHING: ICD-10-CM

## 2017-09-10 DIAGNOSIS — L21.0 PITYRIASIS: Primary | ICD-10-CM

## 2017-09-10 PROCEDURE — 99213 OFFICE O/P EST LOW 20 MIN: CPT | Performed by: PHYSICIAN ASSISTANT

## 2017-09-10 RX ORDER — CETIRIZINE HYDROCHLORIDE 10 MG/1
10 TABLET ORAL EVERY EVENING
Qty: 30 TABLET | Refills: 1 | Status: SHIPPED | OUTPATIENT
Start: 2017-09-10 | End: 2018-11-27

## 2017-09-10 RX ORDER — METHYLPREDNISOLONE 4 MG
TABLET, DOSE PACK ORAL
Qty: 21 TABLET | Refills: 0 | Status: SHIPPED | OUTPATIENT
Start: 2017-09-10 | End: 2017-12-22

## 2017-09-10 NOTE — NURSING NOTE
"Chief Complaint   Patient presents with     Derm Problem     itchy rash on various parts of body since yedterday.        Initial /72  Pulse 59  Temp 97.6  F (36.4  C) (Oral)  Wt 213 lb (96.6 kg)  BMI 25.26 kg/m2 Estimated body mass index is 25.26 kg/(m^2) as calculated from the following:    Height as of 8/28/17: 6' 5\" (1.956 m).    Weight as of this encounter: 213 lb (96.6 kg).  Medication Reconciliation: complete    "

## 2017-09-10 NOTE — PROGRESS NOTES
SUBJECTIVE:  Sandra Cates is a 41 year old male who presents to the clinic today for a rash, itching  Onset of rash was few day(s) ago.   Rash is still present.  Location of the rash: back, chest and arms.  Quality/symptoms of rash: itchy patches   Symptoms are mild and moderate and rash seems to be worsening.  Previous history of a similar rash? No  Recent exposure history: none known  Recent new medications: None  Associated symptoms include: itching.    Past Medical History:   Diagnosis Date     Lactose intolerance      Thyroid disease         Allergies   Allergen Reactions     No Known Allergies      Milk Protein Extract Hives and Rash     Social History   Substance Use Topics     Smoking status: Former Smoker     Smokeless tobacco: Never Used      Comment: 1/2015 using Chantix, last cigarette 1st week of January 2015     Alcohol use No       ROS:  CONSTITUTIONAL:NEGATIVE for fever, chills, change in weight  INTEGUMENTARY/SKIN: POSITIVE for itching   ENT/MOUTH: NEGATIVE for ear, mouth and throat problems  RESP:NEGATIVE for significant cough or SOB  CV: NEGATIVE for chest pain, palpitations or peripheral edema  GI: NEGATIVE for nausea, abdominal pain, heartburn, or change in bowel habits  MUSCULOSKELETAL: NEGATIVE for significant arthralgias or myalgia  NEURO: NEGATIVE for weakness, dizziness or paresthesias    EXAM:   /72  Pulse 59  Temp 97.6  F (36.4  C) (Oral)  Wt 213 lb (96.6 kg)  BMI 25.26 kg/m2  GENERAL: alert, no acute distress.  SKIN: Rash description:    Distribution: generalized  Location: back chest and arms    Color: skin color,  Lesion type: patchy, isolated with itching  RESP: lungs clear to auscultation - no rales, rhonchi or wheezes  CV: regular rates and rhythm, normal S1 S2, no murmur noted  MS:  extremities normal- no gross deformities noted, no erythema, FROM noted in all extremities  NEURO: Normal strength and tone, sensory exam grossly normal,  normal speech and  mentation    ASSESSMENT/PLAN:    ICD-10-CM    1. Pityriasis L21.0    2. Itching L29.9 methylPREDNISolone (MEDROL DOSEPAK) 4 MG tablet     cetirizine (ZYRTEC) 10 MG tablet           1) See today's orders.  2) Follow-up with primary clinic if not improving

## 2017-09-10 NOTE — MR AVS SNAPSHOT
After Visit Summary   9/10/2017    Sandra Cates    MRN: [id number]           Patient Information     Date Of Birth          1975        Visit Information        Provider Department      9/10/2017 1:50 PM Jamey Alejandro PA-C [facility]        Today's Diagnoses     Pityriasis    -  1    Itching           Follow-ups after your visit        Who to contact     If you have questions or need follow up information about today's clinic visit or your schedule please contact [facility] directly at [phone].  Normal or non-critical lab and imaging results will be communicated to you by MyChart, letter or phone within 4 business days after the clinic has received the results. If you do not hear from us within 7 days, please contact the clinic through [employer] or phone. If you have a critical or abnormal lab result, we will notify you by phone as soon as possible.  Submit refill requests through [employer] or call your pharmacy and they will forward the refill request to us. Please allow 3 business days for your refill to be completed.          Additional Information About Your Visit        MyChart Information     [employer] gives you secure access to your electronic health record. If you see a primary care provider, you can also send messages to your care team and make appointments. If you have questions, please call your primary care clinic.  If you do not have a primary care provider, please call [phone] and they will assist you.        Care EveryWhere ID     This is your Care EveryWhere ID. This could be used by other organizations to access your [place] medical records  [id number]        Your Vitals Were     Pulse Temperature BMI (Body Mass Index)             59 97.6  F (36.4  C) (Oral) 25.26 kg/m2          Blood Pressure from Last 3 Encounters:   09/10/17 143/72   08/28/17 127/64   05/08/17 141/77    Weight from Last 3 Encounters:    09/10/17 213 lb (96.6 kg)   08/28/17 215 lb 6.4 oz (97.7 kg)   05/03/17 209 lb (94.8 kg)              Today, you had the following     No orders found for display         Today's Medication Changes          These changes are accurate as of: 9/10/17  2:37 PM.  If you have any questions, ask your nurse or doctor.               Start taking these medicines.        Dose/Directions    cetirizine 10 MG tablet   Commonly known as:  zyrTEC   Used for:  Itching   Started by:  Jamey Alejandro PA-C        Dose:  10 mg   Take 1 tablet (10 mg) by mouth every evening   Quantity:  30 tablet   Refills:  1       methylPREDNISolone 4 MG tablet   Commonly known as:  MEDROL DOSEPAK   Used for:  Itching   Started by:  Jamey Alejandro PA-C        Follow package instructions   Quantity:  21 tablet   Refills:  0            Where to get your medicines      These medications were sent to [facility]/pharmacy #[id number] [address] [facility]  [address] [place] [id number]     Phone:  [phone]     cetirizine 10 MG tablet    methylPREDNISolone 4 MG tablet                Primary Care Provider Office Phone # Fax #    Ericka Ortega [phone] [address] [address]        Equal Access to Services     ISABEL BENNETT AH: Hadii aad ku hadasho Sojenise, waaxda luqadaha, qaybta kaalmada adeegyada, sherri portilloin hayaan tolu bahena . So [facility] [phone].    ATENCIÓN: Si habla español, tiene a fuller disposición servicios gratuitos de asistencia lingüística. Nancy al [phone].    We comply with applicable federal civil rights laws and [place] laws. We do not discriminate on the basis of race, color, national origin, age, disability sex, sexual orientation or gender identity.            Thank you!     Thank you for choosing [facility]  for your care. Our goal is always to provide you with excellent care. Hearing back from our patients  is one way we can continue to improve our services. Please take a few minutes to complete the written survey that you may receive in the mail after your visit with us. Thank you!             Your Updated Medication List - Protect others around you: Learn how to safely use, store and throw away your medicines at www.disposemymeds.org.          This list is accurate as of: 9/10/17  2:37 PM.  Always use your most recent med list.                   Brand Name Dispense Instructions for use Diagnosis    cetirizine 10 MG tablet    zyrTEC    30 tablet    Take 1 tablet (10 mg) by mouth every evening    Itching       levothyroxine 112 MCG tablet    SYNTHROID/LEVOTHROID    90 tablet    Take 1 tablet (112 mcg) by mouth daily    Hashimoto's thyroiditis       methylPREDNISolone 4 MG tablet    MEDROL DOSEPAK    21 tablet    Follow package instructions    Itching       varenicline 1 MG tablet    CHANTIX    56 tablet    Take 1 tablet (1 mg) by mouth 2 times daily As maintenance after finishing starter shakeel    Tobacco use disorder

## 2017-09-21 ENCOUNTER — TRANSFERRED RECORDS (OUTPATIENT)
Dept: HEALTH INFORMATION MANAGEMENT | Facility: CLINIC | Age: 42
End: 2017-09-21

## 2017-09-24 ENCOUNTER — HOSPITAL ENCOUNTER (EMERGENCY)
Facility: CLINIC | Age: 42
Discharge: HOME | End: 2017-09-24
Attending: EMERGENCY MEDICINE | Admitting: EMERGENCY MEDICINE
Payer: COMMERCIAL

## 2017-09-24 ENCOUNTER — APPOINTMENT (OUTPATIENT)
Dept: GENERAL RADIOLOGY | Facility: CLINIC | Age: 42
End: 2017-09-24
Attending: PHYSICIAN ASSISTANT
Payer: COMMERCIAL

## 2017-09-24 VITALS
OXYGEN SATURATION: 97 % | DIASTOLIC BLOOD PRESSURE: 70 MMHG | BODY MASS INDEX: 25.15 KG/M2 | HEIGHT: 77 IN | TEMPERATURE: 97.7 F | HEART RATE: 70 BPM | SYSTOLIC BLOOD PRESSURE: 145 MMHG | WEIGHT: 213 LBS | RESPIRATION RATE: 16 BRPM

## 2017-09-24 DIAGNOSIS — M25.512 ACUTE PAIN OF LEFT SHOULDER: ICD-10-CM

## 2017-09-24 PROCEDURE — 73030 X-RAY EXAM OF SHOULDER: CPT | Mod: LT

## 2017-09-24 PROCEDURE — 99283 EMERGENCY DEPT VISIT LOW MDM: CPT

## 2017-09-24 ASSESSMENT — ENCOUNTER SYMPTOMS
HEADACHES: 0
NUMBNESS: 0
SHORTNESS OF BREATH: 0
WEAKNESS: 0
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
## 2017-09-24 NOTE — LETTER
To Whom it may concern:      Sandra Cates was seen in our Emergency Department today, 09/24/17.  I expect his condition to improve over the next 3 - 5 days.  He may return to work immediately, though should not use his left shoulder for lifting until pain is improved.    Sincerely,  Chad A. Trierweiler, MD
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
## 2017-09-24 NOTE — ED AVS SNAPSHOT
Emergency Department    [address] [place] [address]    Phone:  [phone]    Fax:  [phone]                                       Sandra Cates   MRN: [id number]    Department:   Emergency Department   Date of Visit:  9/24/2017           After Visit Summary Signature Page     I have received my discharge instructions, and my questions have been answered. I have discussed any challenges I see with this plan with the nurse or doctor.    ..........................................................................................................................................  Patient/Patient Representative Signature      ..........................................................................................................................................  Patient Representative Print Name and Relationship to Patient    ..................................................               ................................................  Date                                            Time    ..........................................................................................................................................  Reviewed by Signature/Title    ...................................................              ..............................................  Date                                                            Time

## 2017-11-25 DIAGNOSIS — E06.3 HASHIMOTO'S THYROIDITIS: ICD-10-CM

## 2017-11-25 NOTE — LETTER
[place] [address] [facility] [address]  Phone: [phone]        November 29, 2017      Sandra Darryn         [address] [facility] [address]            Dear Mr. Cates,    We are concerned about your health care.  We recently provided you with a medication refill.  Many medications require routine follow-up with your Doctor.      At this time we ask that: You schedule a lab appointment for a follow up Thyroid lab.     Your prescription: Has been refilled for 1 month so you may have time for the above noted follow-up.      Thank you,      Ericka Ortega MD/ kelly

## 2017-11-28 RX ORDER — LEVOTHYROXINE SODIUM 112 UG/1
112 TABLET ORAL DAILY
Qty: 30 TABLET | Refills: 0 | Status: SHIPPED | OUTPATIENT
Start: 2017-11-28 | End: 2017-12-22

## 2017-12-18 ENCOUNTER — OFFICE VISIT (OUTPATIENT)
Dept: URGENT CARE | Facility: URGENT CARE | Age: 42
End: 2017-12-18
Payer: COMMERCIAL

## 2017-12-18 VITALS
SYSTOLIC BLOOD PRESSURE: 123 MMHG | OXYGEN SATURATION: 97 % | TEMPERATURE: 99 F | WEIGHT: 211.9 LBS | BODY MASS INDEX: 25.13 KG/M2 | HEART RATE: 67 BPM | DIASTOLIC BLOOD PRESSURE: 66 MMHG

## 2017-12-18 DIAGNOSIS — M62.81 GENERALIZED MUSCLE WEAKNESS: ICD-10-CM

## 2017-12-18 DIAGNOSIS — R50.9 FEVER AND CHILLS: Primary | ICD-10-CM

## 2017-12-18 LAB
FLUAV+FLUBV AG SPEC QL: NEGATIVE
FLUAV+FLUBV AG SPEC QL: NEGATIVE
SPECIMEN SOURCE: NORMAL

## 2017-12-18 PROCEDURE — 99213 OFFICE O/P EST LOW 20 MIN: CPT | Performed by: FAMILY MEDICINE

## 2017-12-18 PROCEDURE — 87804 INFLUENZA ASSAY W/OPTIC: CPT | Performed by: FAMILY MEDICINE

## 2017-12-18 NOTE — MR AVS SNAPSHOT
After Visit Summary   12/18/2017    Sandra Cates    MRN: [id number]           Patient Information     Date Of Birth          1975        Visit Information        Provider Department      12/18/2017 2:55 PM Wai Bryan,  [facility]        Today's Diagnoses     Fever and chills    -  1    Generalized muscle weakness           Follow-ups after your visit        Your next 10 appointments already scheduled     Dec 22, 2017  3:00 PM CST   [facility]ga [facility]    [address] [facility] [address] [phone]           Bring a current list of meds and any records pertaining to this visit. For Physicals, please bring immunization records and any forms needing to be filled out. Please arrive 10 minutes early to complete paperwork.              Who to contact     If you have questions or need follow up information about today's clinic visit or your schedule please contact [facility] directly at [phone].  Normal or non-critical lab and imaging results will be communicated to you by [employer], letter or phone within 4 business days after the clinic has received the results. If you do not hear from us within 7 days, please contact the clinic through [employer] or phone. If you have a critical or abnormal lab result, we will notify you by phone as soon as possible.  Submit refill requests through [employer] or call your pharmacy and they will forward the refill request to us. Please allow 3 business days for your refill to be completed.          Additional Information About Your Visit        MyChart Information     [employer] gives you secure access to your electronic health record. If you see a primary care provider, you can also send messages to your care team and make appointments. If you have questions, please call your primary care clinic.  If you do not have a  primary care provider, please call [phone] and they will assist you.        Care EveryWhere ID     This is your Care EveryWhere ID. This could be used by other organizations to access your [facility] medical records  [id number]        Your Vitals Were     Pulse Temperature Pulse Oximetry BMI (Body Mass Index)          67 99  F (37.2  C) (Oral) 97% 25.13 kg/m2         Blood Pressure from Last 3 Encounters:   12/18/17 123/66   09/24/17 145/70   09/10/17 143/72    Weight from Last 3 Encounters:   12/18/17 211 lb 14.4 oz (96.1 kg)   09/24/17 213 lb (96.6 kg)   09/10/17 213 lb (96.6 kg)              We Performed the Following     Influenza A/B antigen        Primary Care Provider Office Phone # Fax #    Ericka R MD Shannon [phone] [address]  [place]                [place] [address]        Equal Access to Services     [facility]: Hadii aad ku hadasho Sotimoali, waaxda luqadaha, qaybta kaalmada adeegyada, waxay idiin hayaan kalpanaeg soni bahena . So [facility] [phone].    ATENCIÓN: Si habla español, tiene a fuller disposición servicios gratuitos de asistencia lingüística. Llame al [phone].    We comply with applicable federal civil rights laws and [place] laws. We do not discriminate on the basis of race, color, national origin, age, disability, sex, sexual orientation, or gender identity.            Thank you!     Thank you for choosing [facility]  for your care. Our goal is always to provide you with excellent care. Hearing back from our patients is one way we can continue to improve our services. Please take a few minutes to complete the written survey that you may receive in the mail after your visit with us. Thank you!             Your Updated Medication List - Protect others around you: Learn how to safely use, store and throw away your medicines at www.disposemymeds.org.          This list is accurate as of: 12/18/17  4:14 PM.  Always use your most  recent med list.                   Brand Name Dispense Instructions for use Diagnosis    cetirizine 10 MG tablet    zyrTEC    30 tablet    Take 1 tablet (10 mg) by mouth every evening    Itching       * levothyroxine 112 MCG tablet    SYNTHROID/LEVOTHROID    30 tablet    Take 1 tablet (112 mcg) by mouth daily Due for lab TSH please call the clinic and make lab appointment    Hashimoto's thyroiditis       * levothyroxine 112 MCG tablet    SYNTHROID/LEVOTHROID    30 tablet    Take 1 tablet (112 mcg) by mouth daily Due for labs    Hashimoto's thyroiditis       methylPREDNISolone 4 MG tablet    MEDROL DOSEPAK    21 tablet    Follow package instructions    Itching       varenicline 1 MG tablet    CHANTIX    56 tablet    Take 1 tablet (1 mg) by mouth 2 times daily As maintenance after finishing starter shakeel    Tobacco use disorder       * Notice:  This list has 2 medication(s) that are the same as other medications prescribed for you. Read the directions carefully, and ask your doctor or other care provider to review them with you.

## 2017-12-21 NOTE — PROGRESS NOTES
SUBJECTIVE:   Sandra Cates is a 42 year old male who presents to clinic today for the following health issues:      ED/UC Followup:    Facility:  [facility] urgent care  Date of visit: 12/18/17  Reason for visit: influenza A/B antigen  Current Status: OUT OF ER     Reports that he woke up on 12/16/17 feeling sick, and got worse  He went to ED to 12/18/17 and was tested negative for influenza  Feels better now    Problem list and histories reviewed & adjusted, as indicated.  Additional history: as documented    Patient Active Problem List   Diagnosis     CARDIOVASCULAR SCREENING; LDL GOAL LESS THAN 160     Lactose intolerance     Elevated liver enzymes     Hashimoto's thyroiditis     FH: prostate cancer     Tear of medial cartilage or meniscus of knee, current     Anti-TPO antibodies present     Hypothyroidism, unspecified hypothyroidism type     History reviewed. No pertinent surgical history.    Social History   Substance Use Topics     Smoking status: Former Smoker     Smokeless tobacco: Never Used      Comment: 1/2015 using Chantix, last cigarette 1st week of January 2015     Alcohol use No     Family History   Problem Relation Age of Onset     Coronary Artery Disease Mother      Prostate Cancer Father          Current Outpatient Prescriptions   Medication Sig Dispense Refill     levothyroxine (SYNTHROID/LEVOTHROID) 112 MCG tablet Take 1 tablet (112 mcg) by mouth daily 90 tablet 1     cetirizine (ZYRTEC) 10 MG tablet Take 1 tablet (10 mg) by mouth every evening 30 tablet 1     varenicline (CHANTIX) 1 MG tablet Take 1 tablet (1 mg) by mouth 2 times daily As maintenance after finishing starter shakeel 56 tablet 2     [DISCONTINUED] levothyroxine (SYNTHROID/LEVOTHROID) 112 MCG tablet Take 1 tablet (112 mcg) by mouth daily Due for labs 90 tablet 1     [DISCONTINUED] levothyroxine (SYNTHROID/LEVOTHROID) 112 MCG tablet Take 1 tablet (112 mcg) by mouth daily Due for labs 30 tablet 0     [DISCONTINUED]  "levothyroxine (SYNTHROID/LEVOTHROID) 112 MCG tablet Take 1 tablet (112 mcg) by mouth daily Due for lab TSH please call the clinic and make lab appointment 30 tablet 0     Allergies   Allergen Reactions     No Known Allergies      Milk Protein Extract Hives and Rash     Medications and Labs reviewed in EPIC      Reviewed and updated as needed this visit by clinical staff  Tobacco  Allergies  Meds  Problems  Med Hx  Surg Hx  Fam Hx  Soc Hx        Reviewed and updated as needed this visit by Provider         ROS:  Constitutional, HEENT, cardiovascular, pulmonary, GI, , musculoskeletal, neuro, skin, endocrine and psych systems are negative, except as otherwise noted.    This document serves as a record of the services and decisions personally performed and made by Ericka Ortega MD. It was created on her behalf by Vani Whitfield, a trained medical scribe. The creation of this document is based on the provider's statements to the medical scribe.  Vani Whitfield 2:57 PM December 22, 2017    OBJECTIVE:   /70  Pulse 63  Temp 97.8  F (36.6  C) (Oral)  Ht 1.956 m (6' 5\")  Wt 95.3 kg (210 lb)  SpO2 99%  BMI 24.9 kg/m2  Body mass index is 24.9 kg/(m^2).    GENERAL APPEARANCE: healthy, alert and no distress  EYES: Eyes grossly normal to inspection, PERRL and conjunctivae and sclerae normal  HENT: ear canals and TM's normal and nose and mouth without ulcers or lesions  NECK: no adenopathy  RESP: lungs clear to auscultation - no rales, rhonchi or wheezes  CV: regular rates and rhythm, normal S1 S2, no S3    Diagnostic Test Results:  No results found for this or any previous visit (from the past 24 hour(s)).    Reviewed and discussed TSH blood tests done on 08/28/17  Reviewed and discussed lipid profile done on 08/28/17    ASSESSMENT/PLAN:     Sandra was seen today for hospital f/u.    Diagnoses and all orders for this visit:    Hashimoto's thyroiditis  -     Discontinue: levothyroxine " (SYNTHROID/LEVOTHROID) 112 MCG tablet; Take 1 tablet (112 mcg) by mouth daily Due for labs  -     TSH with free T4 reflex  -     levothyroxine (SYNTHROID/LEVOTHROID) 112 MCG tablet; Take 1 tablet (112 mcg) by mouth daily  Follows up with gastroenterologist  Hasn't been taking pantoprazole   Reports that he hadn't been compliant with medication in the summer due to Jehovah's witness fasting  Patient endorses that he is taking levothyroxine consistently now  I ordered blood work today and will notify patient of his results via MyChart  If TSH levels aren't improving, I will increase his dose of levothyroxine    ED Follow Up  Patient came in today for an ED follow-up  He presented to the ED on 12/18/17 with flu-like symptoms but was tested negative for influenza  His symptoms have since subsided, and he is feeling fine    Patient Instructions   Labs today  I refilled your prescriptions  Follow up in 3 months  Schedule an early morning appointment and come fasting  Seek sooner medical attention if there is any worsening of symptoms or problems    The information in this document, created by the medical scribe for me, accurately reflects the services I personally performed and the decisions made by me. I have reviewed and approved this document for accuracy prior to leaving the patient care area.  December 22, 2017 3:09 PM    Ericka Ortega MD  [facility]

## 2017-12-22 ENCOUNTER — OFFICE VISIT (OUTPATIENT)
Dept: FAMILY MEDICINE | Facility: CLINIC | Age: 42
End: 2017-12-22
Payer: COMMERCIAL

## 2017-12-22 VITALS
TEMPERATURE: 97.8 F | OXYGEN SATURATION: 99 % | SYSTOLIC BLOOD PRESSURE: 125 MMHG | HEIGHT: 77 IN | BODY MASS INDEX: 24.79 KG/M2 | WEIGHT: 210 LBS | DIASTOLIC BLOOD PRESSURE: 70 MMHG | HEART RATE: 63 BPM

## 2017-12-22 DIAGNOSIS — E06.3 HASHIMOTO'S THYROIDITIS: Primary | ICD-10-CM

## 2017-12-22 PROCEDURE — 84443 ASSAY THYROID STIM HORMONE: CPT | Performed by: INTERNAL MEDICINE

## 2017-12-22 PROCEDURE — 99213 OFFICE O/P EST LOW 20 MIN: CPT | Performed by: INTERNAL MEDICINE

## 2017-12-22 PROCEDURE — 36415 COLL VENOUS BLD VENIPUNCTURE: CPT | Performed by: INTERNAL MEDICINE

## 2017-12-22 RX ORDER — LEVOTHYROXINE SODIUM 112 UG/1
112 TABLET ORAL DAILY
Qty: 90 TABLET | Refills: 1 | Status: SHIPPED | OUTPATIENT
Start: 2017-12-22 | End: 2017-12-22

## 2017-12-22 RX ORDER — LEVOTHYROXINE SODIUM 112 UG/1
112 TABLET ORAL DAILY
Qty: 90 TABLET | Refills: 1 | Status: SHIPPED | OUTPATIENT
Start: 2017-12-22 | End: 2018-06-28

## 2017-12-22 NOTE — PATIENT INSTRUCTIONS
Labs today  I refilled your prescriptions  Follow up in 3 months  Schedule an early morning appointment and come fasting  Seek sooner medical attention if there is any worsening of symptoms or problems

## 2017-12-22 NOTE — MR AVS SNAPSHOT
After Visit Summary   12/22/2017    Sandra Cates    MRN: [id number]           Patient Information     Date Of Birth          1975        Visit Information        Provider Department      12/22/2017 3:00 PM Ericka Ortega MD [facility]        Today's Diagnoses     Hashimoto's thyroiditis    -  1      Care Instructions    Labs today  I refilled your prescriptions  Follow up in 3 months  Schedule an early morning appointment and come fasting  Seek sooner medical attention if there is any worsening of symptoms or problems          Follow-ups after your visit        Who to contact     If you have questions or need follow up information about today's clinic visit or your schedule please contact [facility] directly at [phone].  Normal or non-critical lab and imaging results will be communicated to you by Medical Simulationhart, letter or phone within 4 business days after the clinic has received the results. If you do not hear from us within 7 days, please contact the clinic through Medical Simulationhart or phone. If you have a critical or abnormal lab result, we will notify you by phone as soon as possible.  Submit refill requests through Siasto or call your pharmacy and they will forward the refill request to us. Please allow 3 business days for your refill to be completed.          Additional Information About Your Visit        MyChart Information     Siasto gives you secure access to your electronic health record. If you see a primary care provider, you can also send messages to your care team and make appointments. If you have questions, please call your primary care clinic.  If you do not have a primary care provider, please call [phone] and they will assist you.        Care EveryWhere ID     This is your Care EveryWhere ID. This could be used by other organizations to access your [place] medical records  [id number]        Your Vitals Were     Pulse Temperature Height Pulse  "Oximetry BMI (Body Mass Index)       63 97.8  F (36.6  C) (Oral) 6' 5\" (1.956 m) 99% 24.9 kg/m2        Blood Pressure from Last 3 Encounters:   12/22/17 125/70   12/18/17 123/66   09/24/17 145/70    Weight from Last 3 Encounters:   12/22/17 210 lb (95.3 kg)   12/18/17 211 lb 14.4 oz (96.1 kg)   09/24/17 213 lb (96.6 kg)              We Performed the Following     TSH with free T4 reflex          Today's Medication Changes          These changes are accurate as of: 12/22/17  3:07 PM.  If you have any questions, ask your nurse or doctor.               Start taking these medicines.        Dose/Directions    levothyroxine 112 MCG tablet   Commonly known as:  SYNTHROID/LEVOTHROID   Used for:  Hashimoto's thyroiditis   Started by:  Ericka Ortega MD        Dose:  112 mcg   Take 1 tablet (112 mcg) by mouth daily   Quantity:  90 tablet   Refills:  1            Where to get your medicines      These medications were sent to [facility]/pharmacy #[id number] - [place] [id number] [address]     Phone:  [phone]     levothyroxine 112 MCG tablet                Primary Care Provider Office Phone # Fax #    Ericka Ortega [phone] [address] [address]        Equal Access to Services     ISABEL [facility] AH: Hadii reyes Prater, waaxda mili, qaybta kaalmada dank, sherri mcclendon. So [facility] [phone].    ATENCIÓN: Si habla milly, tiene a fuller disposición servicios gratuitos de asistencia lingüística. Llame al [phone].    We comply with applicable federal civil rights laws and [place] laws. We do not discriminate on the basis of race, color, national origin, age, disability, sex, sexual orientation, or gender identity.            Thank you!     Thank you for choosing [facility]  for your care. Our goal is always to provide you with excellent care. Hearing back from our " patients is one way we can continue to improve our services. Please take a few minutes to complete the written survey that you may receive in the mail after your visit with us. Thank you!             Your Updated Medication List - Protect others around you: Learn how to safely use, store and throw away your medicines at www.disposemymeds.org.          This list is accurate as of: 12/22/17  3:07 PM.  Always use your most recent med list.                   Brand Name Dispense Instructions for use Diagnosis    cetirizine 10 MG tablet    zyrTEC    30 tablet    Take 1 tablet (10 mg) by mouth every evening    Itching       levothyroxine 112 MCG tablet    SYNTHROID/LEVOTHROID    90 tablet    Take 1 tablet (112 mcg) by mouth daily    Hashimoto's thyroiditis       varenicline 1 MG tablet    CHANTIX    56 tablet    Take 1 tablet (1 mg) by mouth 2 times daily As maintenance after finishing starter shakeel    Tobacco use disorder

## 2017-12-22 NOTE — NURSING NOTE
"Chief Complaint   Patient presents with     Hospital F/U       Initial /70  Pulse 63  Temp 97.8  F (36.6  C) (Oral)  Ht 6' 5\" (1.956 m)  Wt 210 lb (95.3 kg)  SpO2 99%  BMI 24.9 kg/m2 Estimated body mass index is 24.9 kg/(m^2) as calculated from the following:    Height as of this encounter: 6' 5\" (1.956 m).    Weight as of this encounter: 210 lb (95.3 kg).  Medication Reconciliation: complete   Aimee Dhaliwal CMA      "

## 2017-12-23 LAB — TSH SERPL DL<=0.005 MIU/L-ACNC: 2.46 MU/L (ref 0.4–4)

## 2017-12-25 NOTE — PROGRESS NOTES
Kelton Flores,    This is to inform you regarding your test result.    TSH which is thyroid hormone is normal.  Stay on current dose of levothyroxine.  Recheck TSH in 6 months      Sincerely,      Dr.Nasima Shannon MD,FACP

## 2017-12-28 DIAGNOSIS — E06.3 HASHIMOTO'S THYROIDITIS: ICD-10-CM

## 2017-12-29 RX ORDER — LEVOTHYROXINE SODIUM 112 UG/1
TABLET ORAL
Start: 2017-12-29

## 2018-01-03 NOTE — PROGRESS NOTES
SUBJECTIVE: Sandra Cates is a 42 year old male presenting with a chief complaint of fever and weakness.  Onset of symptoms was day(s) ago.  Course of illness is same.    Severity moderate  Current and Associated symptoms: runny nose and cough - non-productive  Treatment measures tried include Tylenol/Ibuprofen.  Predisposing factors include None.    Past Medical History:   Diagnosis Date     Lactose intolerance      Thyroid disease      Allergies   Allergen Reactions     No Known Allergies      Milk Protein Extract Hives and Rash     Social History   Substance Use Topics     Smoking status: Former Smoker     Smokeless tobacco: Never Used      Comment: 1/2015 using Chantix, last cigarette 1st week of January 2015     Alcohol use No       ROS:  SKIN: no rash  GI: no vomiting    OBJECTIVE:  /66  Pulse 67  Temp 99  F (37.2  C) (Oral)  Wt 211 lb 14.4 oz (96.1 kg)  SpO2 97%  BMI 25.13 kg/m2   GENERAL APPEARANCE: healthy, alert and no distress  EYES: EOMI,  PERRL, conjunctiva clear  HENT: ear canals and TM's normal.  Nose and mouth without ulcers, erythema or lesions  NECK: supple, nontender, no lymphadenopathy  RESP: lungs clear to auscultation - no rales, rhonchi or wheezes  CV: regular rates and rhythm, normal S1 S2, no murmur noted  SKIN: no suspicious lesions or rashes      ICD-10-CM    1. Fever and chills R50.9 Influenza A/B antigen   2. Generalized muscle weakness M62.81        Fluids/Rest, f/u if worse/not any better

## 2018-03-16 ENCOUNTER — OFFICE VISIT (OUTPATIENT)
Dept: URGENT CARE | Facility: URGENT CARE | Age: 43
End: 2018-03-16
Payer: COMMERCIAL

## 2018-03-16 VITALS
WEIGHT: 204 LBS | RESPIRATION RATE: 16 BRPM | HEART RATE: 64 BPM | SYSTOLIC BLOOD PRESSURE: 114 MMHG | DIASTOLIC BLOOD PRESSURE: 66 MMHG | TEMPERATURE: 97.8 F | BODY MASS INDEX: 24.19 KG/M2

## 2018-03-16 DIAGNOSIS — L03.317 CELLULITIS OF BUTTOCK: ICD-10-CM

## 2018-03-16 DIAGNOSIS — L02.92 FURUNCLE OF SKIN OR SUBCUTANEOUS TISSUE: Primary | ICD-10-CM

## 2018-03-16 PROCEDURE — 99213 OFFICE O/P EST LOW 20 MIN: CPT | Performed by: PHYSICIAN ASSISTANT

## 2018-03-16 RX ORDER — CEPHALEXIN 500 MG/1
500 CAPSULE ORAL 3 TIMES DAILY
Qty: 30 CAPSULE | Refills: 0 | Status: SHIPPED | OUTPATIENT
Start: 2018-03-16 | End: 2018-10-12

## 2018-03-16 NOTE — NURSING NOTE
"Chief Complaint   Patient presents with     Derm Problem     lump upper lt thight area       Initial /66 (Cuff Size: Adult Large)  Pulse 64  Temp 97.8  F (36.6  C) (Oral)  Resp 16  Wt 204 lb (92.5 kg)  BMI 24.19 kg/m2 Estimated body mass index is 24.19 kg/(m^2) as calculated from the following:    Height as of 12/22/17: 6' 5\" (1.956 m).    Weight as of this encounter: 204 lb (92.5 kg).  Medication Reconciliation: complete Chacho HOPPER    "

## 2018-03-16 NOTE — MR AVS SNAPSHOT
After Visit Summary   3/16/2018    Sandra Cates    MRN: [id number]           Patient Information     Date Of Birth          1975        Visit Information        Provider Department      3/16/2018 9:05 AM Jamey Alejandro PA-C [facility]        Today's Diagnoses     Furuncle of skin or subcutaneous tissue    -  1    Cellulitis of buttock           Follow-ups after your visit        Who to contact     If you have questions or need follow up information about today's clinic visit or your schedule please contact [facility] directly at [phone].  Normal or non-critical lab and imaging results will be communicated to you by Quwan.comhart, letter or phone within 4 business days after the clinic has received the results. If you do not hear from us within 7 days, please contact the clinic through Quwan.comhart or phone. If you have a critical or abnormal lab result, we will notify you by phone as soon as possible.  Submit refill requests through Wisegate or call your pharmacy and they will forward the refill request to us. Please allow 3 business days for your refill to be completed.          Additional Information About Your Visit        MyChart Information     Wisegate gives you secure access to your electronic health record. If you see a primary care provider, you can also send messages to your care team and make appointments. If you have questions, please call your primary care clinic.  If you do not have a primary care provider, please call [phone] and they will assist you.        Care EveryWhere ID     This is your Care EveryWhere ID. This could be used by other organizations to access your [place] medical records  [id number]        Your Vitals Were     Pulse Temperature Respirations BMI (Body Mass Index)          64 97.8  F (36.6  C) (Oral) 16 24.19 kg/m2         Blood Pressure from Last 3 Encounters:   03/16/18 114/66   12/22/17 125/70    12/18/17 123/66    Weight from Last 3 Encounters:   03/16/18 204 lb (92.5 kg)   12/22/17 210 lb (95.3 kg)   12/18/17 211 lb 14.4 oz (96.1 kg)              Today, you had the following     No orders found for display         Today's Medication Changes          These changes are accurate as of 3/16/18 11:59 PM.  If you have any questions, ask your nurse or doctor.               Start taking these medicines.        Dose/Directions    cephALEXin 500 MG capsule   Commonly known as:  KEFLEX   Used for:  Furuncle of skin or subcutaneous tissue, Cellulitis of buttock   Started by:  Jamey Alejandro PA-C        Dose:  500 mg   Take 1 capsule (500 mg) by mouth 3 times daily   Quantity:  30 capsule   Refills:  0            Where to get your medicines      These medications were sent to [facility] [address] [place] [address] [address] [facility] [address]  [address] [facility] [address]    Hours:  24-hours Phone:  [phone]     cephALEXin 500 MG capsule                Primary Care Provider Office Phone # Fax #    Ericka Ortega [phone] [address] [address]        Equal Access to Services     SOHAIL BENNETT AH: Hadii aad ku hadasho Soomaali, waaxda luqadaha, qaybta kaalmada adeegyada, waxay idiin hayaan adelexie khsaurabh bahena . So [facility] [phone].    ATENCIÓN: Si habla español, tiene a fuller disposición servicios gratuitos de asistencia lingüística. Llame al [phone].    We comply with applicable federal civil rights laws and [place] laws. We do not discriminate on the basis of race, color, national origin, age, disability, sex, sexual orientation, or gender identity.            Thank you!     Thank you for choosing [facility]  for your care. Our goal is always to provide you with excellent care. Hearing back from our patients is one way we can continue to improve our services. Please take a few  minutes to complete the written survey that you may receive in the mail after your visit with us. Thank you!             Your Updated Medication List - Protect others around you: Learn how to safely use, store and throw away your medicines at www.disposemymeds.org.          This list is accurate as of 3/16/18 11:59 PM.  Always use your most recent med list.                   Brand Name Dispense Instructions for use Diagnosis    cephALEXin 500 MG capsule    KEFLEX    30 capsule    Take 1 capsule (500 mg) by mouth 3 times daily    Furuncle of skin or subcutaneous tissue, Cellulitis of buttock       cetirizine 10 MG tablet    zyrTEC    30 tablet    Take 1 tablet (10 mg) by mouth every evening    Itching       levothyroxine 112 MCG tablet    SYNTHROID/LEVOTHROID    90 tablet    Take 1 tablet (112 mcg) by mouth daily    Hashimoto's thyroiditis       varenicline 1 MG tablet    CHANTIX    56 tablet    Take 1 tablet (1 mg) by mouth 2 times daily As maintenance after finishing starter shakeel    Tobacco use disorder

## 2018-03-20 NOTE — PROGRESS NOTES
SUBJECTIVE:  Sandra Cates is a 42 year old male who presents to the clinic today for a rash.  Onset of rash was 1 week(s) ago.   Rash is still present.  Location of the rash: left groin, inguinal area.  Quality/symptoms of rash: hard, indurated erythematous   Symptoms are mild to moderate and rash seems to be worsening.  Previous history of a similar rash? yes  Recent exposure history: none  Recent new medications: none  Associated symptoms include: indurated and erythematous area.    Past Medical History:   Diagnosis Date     Lactose intolerance      Thyroid disease         Allergies   Allergen Reactions     No Known Allergies      Milk Protein Extract Hives and Rash     Social History   Substance Use Topics     Smoking status: Former Smoker     Smokeless tobacco: Never Used      Comment: 1/2015 using Chantix, last cigarette 1st week of January 2015     Alcohol use No       ROS:  CONSTITUTIONAL:NEGATIVE for fever, chills, change in weight  INTEGUMENTARY/SKIN: POSITIVE for left inguinal area tenderness swelling  RESP:NEGATIVE for significant cough or SOB  CV: NEGATIVE for chest pain, palpitations or peripheral edema  GI: NEGATIVE for nausea, abdominal pain, heartburn, or change in bowel habits  MUSCULOSKELETAL: NEGATIVE for significant arthralgias or myalgia  NEURO: NEGATIVE for weakness, dizziness or paresthesias    EXAM:   /66 (Cuff Size: Adult Large)  Pulse 64  Temp 97.8  F (36.6  C) (Oral)  Resp 16  Wt 204 lb (92.5 kg)  BMI 24.19 kg/m2  GENERAL: alert, no acute distress.  SKIN: Rash description:    Distribution: localized  Location: left groin    Color: erythematoud,  Lesion type: indurated localized area, isolated with inflammation  NECK: supple, non-tender to palpation, no adenopathy noted  RESP: lungs clear to auscultation - no rales, rhonchi or wheezes  CV: regular rates and rhythm, normal S1 S2, no murmur noted  NEURO: Normal strength and tone, sensory exam grossly normal,  normal speech and  mentation    ASSESSMENT/PLAN:    ICD-10-CM    1. Furuncle of skin or subcutaneous tissue L02.92 cephALEXin (KEFLEX) 500 MG capsule   2. Cellulitis of buttock L03.317 cephALEXin (KEFLEX) 500 MG capsule     Warm moist compresses  motrin      1) See today's orders.  2) Follow-up with primary clinic if not improving

## 2018-06-06 ENCOUNTER — OFFICE VISIT (OUTPATIENT)
Dept: URGENT CARE | Facility: URGENT CARE | Age: 43
End: 2018-06-06
Payer: COMMERCIAL

## 2018-06-06 VITALS
OXYGEN SATURATION: 97 % | RESPIRATION RATE: 12 BRPM | SYSTOLIC BLOOD PRESSURE: 119 MMHG | HEART RATE: 53 BPM | WEIGHT: 200.5 LBS | BODY MASS INDEX: 23.78 KG/M2 | TEMPERATURE: 98.1 F | DIASTOLIC BLOOD PRESSURE: 80 MMHG

## 2018-06-06 DIAGNOSIS — J01.90 ACUTE SINUSITIS WITH SYMPTOMS > 10 DAYS: Primary | ICD-10-CM

## 2018-06-06 PROCEDURE — 99213 OFFICE O/P EST LOW 20 MIN: CPT | Performed by: PHYSICIAN ASSISTANT

## 2018-06-06 RX ORDER — AZITHROMYCIN 250 MG/1
TABLET, FILM COATED ORAL
Qty: 6 TABLET | Refills: 0 | Status: SHIPPED | OUTPATIENT
Start: 2018-06-06 | End: 2018-10-12

## 2018-06-06 NOTE — PATIENT INSTRUCTIONS
"(J01.90) Acute sinusitis with symptoms > 10 days  (primary encounter diagnosis)  Comment:   Plan: azithromycin (ZITHROMAX) 250 MG tablet          Saline nasal spray \"ocean mist\"    Rest        "

## 2018-06-06 NOTE — MR AVS SNAPSHOT
"              After Visit Summary   6/6/2018    Sandra Cates    MRN: [id number]           Patient Information     Date Of Birth          1975        Visit Information        Provider Department      6/6/2018 1:05 PM Rebekah Osman PA-C [facility]        Today's Diagnoses     Acute sinusitis with symptoms > 10 days    -  1      Care Instructions    (J01.90) Acute sinusitis with symptoms > 10 days  (primary encounter diagnosis)  Comment:   Plan: azithromycin (ZITHROMAX) 250 MG tablet          Saline nasal spray \"ocean mist\"    Rest                Follow-ups after your visit        Who to contact     If you have questions or need follow up information about today's clinic visit or your schedule please contact [facility] directly at [phone].  Normal or non-critical lab and imaging results will be communicated to you by TEAM INTERVALhart, letter or phone within 4 business days after the clinic has received the results. If you do not hear from us within 7 days, please contact the clinic through TEAM INTERVALhart or phone. If you have a critical or abnormal lab result, we will notify you by phone as soon as possible.  Submit refill requests through Ceon or call your pharmacy and they will forward the refill request to us. Please allow 3 business days for your refill to be completed.          Additional Information About Your Visit        MyChart Information     Ceon gives you secure access to your electronic health record. If you see a primary care provider, you can also send messages to your care team and make appointments. If you have questions, please call your primary care clinic.  If you do not have a primary care provider, please call [phone] and they will assist you.        Care EveryWhere ID     This is your Care EveryWhere ID. This could be used by other organizations to access your [place] medical records  [id number]        Your " Vitals Were     Pulse Temperature Respirations Pulse Oximetry BMI (Body Mass Index)       53 98.1  F (36.7  C) (Oral) 12 97% 23.78 kg/m2        Blood Pressure from Last 3 Encounters:   06/06/18 119/80   03/16/18 114/66   12/22/17 125/70    Weight from Last 3 Encounters:   06/06/18 200 lb 8 oz (90.9 kg)   03/16/18 204 lb (92.5 kg)   12/22/17 210 lb (95.3 kg)              Today, you had the following     No orders found for display         Today's Medication Changes          These changes are accurate as of 6/6/18  1:51 PM.  If you have any questions, ask your nurse or doctor.               Start taking these medicines.        Dose/Directions    azithromycin 250 MG tablet   Commonly known as:  ZITHROMAX   Used for:  Acute sinusitis with symptoms > 10 days   Started by:  Rebekah Osman PA-C        Two tablets first day, then one tablet daily for four days.   Quantity:  6 tablet   Refills:  0            Where to get your medicines      [facility] [address] [place] [address] [address] AT [facility]  [address], [facility] [address]     Phone:  [phone]     azithromycin 250 MG tablet                Primary Care Provider Office Phone # Fax #    Ericka JAKE Ortega [phone] [address]  [place]                [place] [address]        Equal Access to Services     ISABEL BENNETT AH: Hadii aad ku hadasho Sotimoali, waaxda luqadaha, qaybta kaalmada adeegyada, waxay gisell lee adelexie mcclendon. So [facility] [phone].    ATENCIÓN: Si habla español, tiene a fuller disposición servicios gratuitos de asistencia lingüística. Llame al [phone].    We comply with applicable federal civil rights laws and [place] laws. We do not discriminate on the basis of race, color, national origin, age, disability, sex, sexual orientation, or gender identity.            Thank you!     Thank you for choosing [facility]  [facility]  for your care. Our goal is always to provide you with excellent care. Hearing back from our patients is one way we can continue to improve our services. Please take a few minutes to complete the written survey that you may receive in the mail after your visit with us. Thank you!             Your Updated Medication List - Protect others around you: Learn how to safely use, store and throw away your medicines at www.disposemymeds.org.          This list is accurate as of 6/6/18  1:51 PM.  Always use your most recent med list.                   Brand Name Dispense Instructions for use Diagnosis    azithromycin 250 MG tablet    ZITHROMAX    6 tablet    Two tablets first day, then one tablet daily for four days.    Acute sinusitis with symptoms > 10 days       cephALEXin 500 MG capsule    KEFLEX    30 capsule    Take 1 capsule (500 mg) by mouth 3 times daily    Furuncle of skin or subcutaneous tissue, Cellulitis of buttock       cetirizine 10 MG tablet    zyrTEC    30 tablet    Take 1 tablet (10 mg) by mouth every evening    Itching       levothyroxine 112 MCG tablet    SYNTHROID/LEVOTHROID    90 tablet    Take 1 tablet (112 mcg) by mouth daily    Hashimoto's thyroiditis       varenicline 1 MG tablet    CHANTIX    56 tablet    Take 1 tablet (1 mg) by mouth 2 times daily As maintenance after finishing starter shakeel    Tobacco use disorder

## 2018-06-06 NOTE — PROGRESS NOTES
SUBJECTIVE:   Sandra Cates is a 42 year old male presenting with a chief complaint of:  1) runny nose and congestion for over 10 days  2) cough for 3 days, productive  3) blood in nasal secretions for 3 days  4) chills.      Onset of symptoms was as above.  Course of illness is worsening.    Severity moderate  Current and Associated symptoms: as above  Treatment measures tried include None tried.  Predisposing factors include None.    Past Medical History:   Diagnosis Date     Lactose intolerance      Thyroid disease      Patient Active Problem List   Diagnosis     CARDIOVASCULAR SCREENING; LDL GOAL LESS THAN 160     Lactose intolerance     Elevated liver enzymes     Hashimoto's thyroiditis     FH: prostate cancer     Tear of medial cartilage or meniscus of knee, current     Anti-TPO antibodies present     Hypothyroidism, unspecified hypothyroidism type     Social History   Substance Use Topics     Smoking status: Former Smoker     Smokeless tobacco: Never Used      Comment: 1/2015 using Chantix, last cigarette 1st week of January 2015     Alcohol use No       ROS:  CONSTITUTIONAL:as per HPI  INTEGUMENTARY/SKIN: NEGATIVE for worrisome rashes, moles or lesions  EYES: NEGATIVE for vision changes or irritation  ENT/MOUTH: as per HPI  RESP:as per HPI  CV: NEGATIVE for chest pain, palpitations or peripheral edema  GI: NEGATIVE for nausea, abdominal pain, heartburn, or change in bowel habits  MUSCULOSKELETAL: NEGATIVE for significant arthralgias or myalgia  NEURO: NEGATIVE for weakness, dizziness or paresthesias  Review of systems negative except as stated above.    OBJECTIVE  :/80 (BP Location: Right arm, Patient Position: Chair, Cuff Size: Adult Regular)  Pulse 53  Temp 98.1  F (36.7  C) (Oral)  Resp 12  Wt 200 lb 8 oz (90.9 kg)  SpO2 97%  BMI 23.78 kg/m2  GENERAL APPEARANCE: healthy, alert and no distress  EYES: EOMI,  PERRL, conjunctiva clear  HENT: ear canals and TM's normal.  Nose and mouth without  "ulcers, erythema or lesions  HENT: nasal turbinates erythematous, swollen and rhinorrhea yellow  NECK: supple, nontender, no lymphadenopathy  RESP: lungs clear to auscultation - no rales, rhonchi or wheezes  CV: regular rates and rhythm, normal S1 S2, no murmur noted  ABDOMEN:  soft, nontender, no HSM or masses and bowel sounds normal  NEURO: Normal strength and tone, sensory exam grossly normal,  normal speech and mentation  SKIN: no suspicious lesions or rashes    J01.90) Acute sinusitis with symptoms > 10 days  (primary encounter diagnosis)  Comment:   Plan: azithromycin (ZITHROMAX) 250 MG tablet          Saline nasal spray \"ocean mist\"    Rest          "

## 2018-06-28 DIAGNOSIS — E06.3 HASHIMOTO'S THYROIDITIS: ICD-10-CM

## 2018-06-28 NOTE — TELEPHONE ENCOUNTER
"Last Written Prescription Date:  12/22/17  Last Fill Quantity: 90 tablet,  # refills: 1   Last office visit: 12/22/2017 with prescribing provider:  Shannon   Future Office Visit:      Requested Prescriptions   Pending Prescriptions Disp Refills     levothyroxine (SYNTHROID/LEVOTHROID) 112 MCG tablet [Pharmacy Med Name: LEVOTHYROXINE 112 MCG TABLET] 90 tablet 1     Sig: TAKE 1 TABLET (112 MCG) BY MOUTH DAILY    Thyroid Protocol Passed    6/28/2018  1:57 AM       Passed - Patient is 12 years or older       Passed - Recent (12 mo) or future (30 days) visit within the authorizing provider's specialty    Patient had office visit in the last 12 months or has a visit in the next 30 days with authorizing provider or within the authorizing provider's specialty.  See \"Patient Info\" tab in inbasket, or \"Choose Columns\" in Meds & Orders section of the refill encounter.           Passed - Normal TSH on file in past 12 months    Recent Labs   Lab Test  12/22/17   1512   TSH  2.46                "

## 2018-06-29 RX ORDER — LEVOTHYROXINE SODIUM 112 UG/1
TABLET ORAL
Qty: 90 TABLET | Refills: 0 | Status: SHIPPED | OUTPATIENT
Start: 2018-06-29 | End: 2018-10-17

## 2018-06-29 NOTE — TELEPHONE ENCOUNTER
Prescription approved per [facility] to the [facility] Refill Protocol.    Claude VAUGHN RN

## 2018-10-12 ENCOUNTER — OFFICE VISIT (OUTPATIENT)
Dept: FAMILY MEDICINE | Facility: CLINIC | Age: 43
End: 2018-10-12
Payer: COMMERCIAL

## 2018-10-12 VITALS
BODY MASS INDEX: 23.14 KG/M2 | DIASTOLIC BLOOD PRESSURE: 70 MMHG | HEIGHT: 77 IN | WEIGHT: 196 LBS | TEMPERATURE: 98.7 F | HEART RATE: 66 BPM | OXYGEN SATURATION: 99 % | SYSTOLIC BLOOD PRESSURE: 114 MMHG

## 2018-10-12 DIAGNOSIS — Z53.9 ERRONEOUS ENCOUNTER--DISREGARD: Primary | ICD-10-CM

## 2018-10-12 RX ORDER — LEVOTHYROXINE SODIUM 112 UG/1
112 TABLET ORAL DAILY
Qty: 90 TABLET | Refills: 0 | Status: CANCELLED | OUTPATIENT
Start: 2018-10-12

## 2018-10-12 NOTE — MR AVS SNAPSHOT
After Visit Summary   10/12/2018    Sandra Cates    MRN: [id number]           Patient Information     Date Of Birth          1975        Visit Information        Provider Department      10/12/2018 9:00 AM Ericka Ortega MD [facility]        Today's Diagnoses     ERRONEOUS ENCOUNTER--DISREGARD    -  1      Care Instructions      Preventive Health Recommendations  Male Ages 40 to 49    Yearly exam:             See your health care provider every year in order to  o   Review health changes.   o   Discuss preventive care.    o   Review your medicines if your doctor has prescribed any.    You should be tested each year for STDs (sexually transmitted diseases) if you re at risk.     Have a cholesterol test every 5 years.     Have a colonoscopy (test for colon cancer) if someone in your family has had colon cancer or polyps before age 50.     After age 45, have a diabetes test (fasting glucose). If you are at risk for diabetes, you should have this test every 3 years.      Talk with your health care provider about whether or not a prostate cancer screening test (PSA) is right for you.    Shots: Get a flu shot each year. Get a tetanus shot every 10 years.     Nutrition:    Eat at least 5 servings of fruits and vegetables daily.     Eat whole-grain bread, whole-wheat pasta and brown rice instead of white grains and rice.     Get adequate Calcium and Vitamin D.     Lifestyle    Exercise for at least 150 minutes a week (30 minutes a day, 5 days a week). This will help you control your weight and prevent disease.     Limit alcohol to one drink per day.     No smoking.     Wear sunscreen to prevent skin cancer.     See your dentist every six months for an exam and cleaning.              Follow-ups after your visit        Who to contact     If you have questions or need follow up information about today's clinic visit or your schedule please contact [facility] directly at  "[phone].  Normal or non-critical lab and imaging results will be communicated to you by MyChart, letter or phone within 4 business days after the clinic has received the results. If you do not hear from us within 7 days, please contact the clinic through Antenovahart or phone. If you have a critical or abnormal lab result, we will notify you by phone as soon as possible.  Submit refill requests through EngineLab or call your pharmacy and they will forward the refill request to us. Please allow 3 business days for your refill to be completed.          Additional Information About Your Visit        Antenovahart Information     EngineLab gives you secure access to your electronic health record. If you see a primary care provider, you can also send messages to your care team and make appointments. If you have questions, please call your primary care clinic.  If you do not have a primary care provider, please call [phone] and they will assist you.        Care EveryWhere ID     This is your Care EveryWhere ID. This could be used by other organizations to access your [place] medical records  [id number]        Your Vitals Were     Pulse Temperature Height Pulse Oximetry BMI (Body Mass Index)       66 98.7  F (37.1  C) (Oral) 6' 5\" (1.956 m) 99% 23.24 kg/m2        Blood Pressure from Last 3 Encounters:   10/12/18 114/70   06/06/18 119/80   03/16/18 114/66    Weight from Last 3 Encounters:   10/12/18 196 lb (88.9 kg)   06/06/18 200 lb 8 oz (90.9 kg)   03/16/18 204 lb (92.5 kg)              Today, you had the following     No orders found for display       Primary Care Provider Office Phone # Fax #    Ericka Ortega [phone] [address]  [place]                [place] [address]        Equal Access to Services     [facility] SABRINA : Hadii reyes Prater, waaxda luqadaha, qaybta kaalmada toluyada, sherri mcclendon. So [facility] [phone].    ATENCIÓN: Si habla español, " tiene a fuller disposición servicios gratuitos de asistencia lingüística. Nancy oropeza [phone].    We comply with applicable federal civil rights laws and [place] laws. We do not discriminate on the basis of race, color, national origin, age, disability, sex, sexual orientation, or gender identity.            Thank you!     Thank you for choosing [facility]  for your care. Our goal is always to provide you with excellent care. Hearing back from our patients is one way we can continue to improve our services. Please take a few minutes to complete the written survey that you may receive in the mail after your visit with us. Thank you!             Your Updated Medication List - Protect others around you: Learn how to safely use, store and throw away your medicines at www.disposemymeds.org.          This list is accurate as of 10/12/18  3:03 PM.  Always use your most recent med list.                   Brand Name Dispense Instructions for use Diagnosis    cetirizine 10 MG tablet    zyrTEC    30 tablet    Take 1 tablet (10 mg) by mouth every evening    Itching       levothyroxine 112 MCG tablet    SYNTHROID/LEVOTHROID    90 tablet    TAKE 1 TABLET (112 MCG) BY MOUTH DAILY    Hashimoto's thyroiditis       varenicline 1 MG tablet    CHANTIX    56 tablet    Take 1 tablet (1 mg) by mouth 2 times daily As maintenance after finishing starter shakeel    Tobacco use disorder

## 2018-10-12 NOTE — PROGRESS NOTES
Patient arrived late to clinic and was roomed for appointment. Dr. Ortega was seeing her patient who was on time for their appointment, but patient left clinic before being seen due to having a meeting.    Alfonso Choudhury CMA on 10/12/2018 at 10:08 AM

## 2018-10-12 NOTE — PROGRESS NOTES
"  SUBJECTIVE:   CC: Sandra Cates is an 42 year old male who presents for preventative health visit.     Healthy Habits:    Do you get at least three servings of calcium containing foods daily (dairy, green leafy vegetables, etc.)? yes    Amount of exercise or daily activities, outside of work: 7 day(s) per week    Problems taking medications regularly No    Medication side effects: No    Have you had an eye exam in the past two years? no    Do you see a dentist twice per year? no    Do you have sleep apnea, excessive snoring or daytime drowsiness?no           Today's PHQ-2 Score:   PHQ-2 ( 1999 Pfizer) 12/22/2017 8/28/2017   Q1: Little interest or pleasure in doing things 0 0   Q2: Feeling down, depressed or hopeless 0 0   PHQ-2 Score 0 0       Abuse: Current or Past(Physical, Sexual or Emotional)- No  Do you feel safe in your environment - Yes    Social History   Substance Use Topics     Smoking status: Former Smoker     Smokeless tobacco: Never Used      Comment: 1/2015 using Chantix, last cigarette 1st week of January 2015     Alcohol use No      If you drink alcohol do you typically have >3 drinks per day or >7 drinks per week? No                      Last PSA:   PSA   Date Value Ref Range Status   08/28/2017 0.73 0 - 4 ug/L Final     Comment:     Assay Method:  Chemiluminescence using Siemens Vista analyzer       Reviewed orders with patient. Reviewed health maintenance and updated orders accordingly - {Yes/No:651971::\"Yes\"}  {Chronicprobdata (Optional):771407}    Reviewed and updated as needed this visit by clinical staff         Reviewed and updated as needed this visit by Provider        {HISTORY OPTIONS (Optional):513592}    ROS:  { :686877::\"CONSTITUTIONAL: NEGATIVE for fever, chills, change in weight\",\"INTEGUMENTARY/SKIN: NEGATIVE for worrisome rashes, moles or lesions\",\"EYES: NEGATIVE for vision changes or irritation\",\"ENT: NEGATIVE for ear, mouth and throat problems\",\"RESP: NEGATIVE for significant " "cough or SOB\",\"CV: NEGATIVE for chest pain, palpitations or peripheral edema\",\"GI: NEGATIVE for nausea, abdominal pain, heartburn, or change in bowel habits\",\" male: negative for dysuria, hematuria, decreased urinary stream, erectile dysfunction, urethral discharge\",\"MUSCULOSKELETAL: NEGATIVE for significant arthralgias or myalgia\",\"NEURO: NEGATIVE for weakness, dizziness or paresthesias\",\"PSYCHIATRIC: NEGATIVE for changes in mood or affect\"}    OBJECTIVE:   There were no vitals taken for this visit.  EXAM:  {Exam Choices:786046}    {Diagnostic Test Results (Optional):842817::\"Diagnostic Test Results:\",\"none \"}    ASSESSMENT/PLAN:   {Diag Picklist:490585}    COUNSELING:  {MALE COUNSELING MESSAGES:658792::\"Reviewed preventive health counseling, as reflected in patient instructions\"}    BP Readings from Last 1 Encounters:   06/06/18 119/80     Estimated body mass index is 23.78 kg/(m^2) as calculated from the following:    Height as of 12/22/17: 6' 5\" (1.956 m).    Weight as of 6/6/18: 200 lb 8 oz (90.9 kg).    {BP Counseling- Complete if BP >= 120/80  (Optional):295993}  {Weight Management Plan (ACO) Complete if BMI is abnormal-  Ages 18-64  BMI >24.9.  Age 65+ with BMI <23 or >30 (Optional):967681}     reports that he has quit smoking. He has never used smokeless tobacco.  {Tobacco Cessation -- Complete if patient is a smoker (Optional):045478}    Counseling Resources:  ATP IV Guidelines  Pooled Cohorts Equation Calculator  FRAX Risk Assessment  ICSI Preventive Guidelines  Dietary Guidelines for Americans, 2010  USDA's MyPlate  ASA Prophylaxis  Lung CA Screening    Ericka Ortega MD  [facility]  "

## 2018-10-17 DIAGNOSIS — E06.3 HASHIMOTO'S THYROIDITIS: ICD-10-CM

## 2018-10-18 NOTE — TELEPHONE ENCOUNTER
"Last Written Prescription Date:  6/29/18  Last Fill Quantity: 90 tablet,  # refills: 0   Last office visit: 12/22/2017 with prescribing provider:  Shannon   Future Office Visit:      Notes to Pharmacy: Please schedule office visit to discuss further refills    Requested Prescriptions   Pending Prescriptions Disp Refills     levothyroxine (SYNTHROID/LEVOTHROID) 112 MCG tablet [Pharmacy Med Name: LEVOTHYROXINE 112 MCG TABLET] 90 tablet 0     Sig: TAKE 1 TABLET (112 MCG) BY MOUTH DAILY    Thyroid Protocol Passed    10/17/2018  5:40 PM       Passed - Patient is 12 years or older       Passed - Recent (12 mo) or future (30 days) visit within the authorizing provider's specialty    Patient had office visit in the last 12 months or has a visit in the next 30 days with authorizing provider or within the authorizing provider's specialty.  See \"Patient Info\" tab in inbasket, or \"Choose Columns\" in Meds & Orders section of the refill encounter.             Passed - Normal TSH on file in past 12 months    Recent Labs   Lab Test  12/22/17   1512   TSH  2.46                "

## 2018-10-19 RX ORDER — LEVOTHYROXINE SODIUM 112 UG/1
TABLET ORAL
Qty: 30 TABLET | Refills: 0 | Status: SHIPPED | OUTPATIENT
Start: 2018-10-19 | End: 2018-11-27

## 2018-10-19 NOTE — TELEPHONE ENCOUNTER
Pt is overdue for TSH recheck. Refilled #30 with note to pharmacy to inform patient to schedule an appointment     Radha SPARKS RN

## 2018-11-27 ENCOUNTER — OFFICE VISIT (OUTPATIENT)
Dept: FAMILY MEDICINE | Facility: CLINIC | Age: 43
End: 2018-11-27
Payer: COMMERCIAL

## 2018-11-27 VITALS
TEMPERATURE: 97 F | DIASTOLIC BLOOD PRESSURE: 74 MMHG | SYSTOLIC BLOOD PRESSURE: 122 MMHG | HEIGHT: 77 IN | WEIGHT: 201 LBS | HEART RATE: 56 BPM | BODY MASS INDEX: 23.73 KG/M2 | OXYGEN SATURATION: 98 %

## 2018-11-27 DIAGNOSIS — Z13.0 SCREENING FOR DEFICIENCY ANEMIA: ICD-10-CM

## 2018-11-27 DIAGNOSIS — M25.562 ACUTE PAIN OF BOTH KNEES: ICD-10-CM

## 2018-11-27 DIAGNOSIS — E06.3 HASHIMOTO'S THYROIDITIS: ICD-10-CM

## 2018-11-27 DIAGNOSIS — R73.09 ELEVATED GLUCOSE: ICD-10-CM

## 2018-11-27 DIAGNOSIS — Z00.00 ROUTINE HISTORY AND PHYSICAL EXAMINATION OF ADULT: Primary | ICD-10-CM

## 2018-11-27 DIAGNOSIS — M25.561 ACUTE PAIN OF BOTH KNEES: ICD-10-CM

## 2018-11-27 DIAGNOSIS — Z12.5 SCREENING FOR PROSTATE CANCER: ICD-10-CM

## 2018-11-27 DIAGNOSIS — B00.1 RECURRENT COLD SORES: ICD-10-CM

## 2018-11-27 DIAGNOSIS — K21.9 GASTROESOPHAGEAL REFLUX DISEASE WITHOUT ESOPHAGITIS: ICD-10-CM

## 2018-11-27 DIAGNOSIS — E78.2 MIXED HYPERLIPIDEMIA: ICD-10-CM

## 2018-11-27 DIAGNOSIS — Z79.899 MEDICATION MANAGEMENT: ICD-10-CM

## 2018-11-27 DIAGNOSIS — R10.13 EPIGASTRIC PAIN: ICD-10-CM

## 2018-11-27 LAB
ERYTHROCYTE [DISTWIDTH] IN BLOOD BY AUTOMATED COUNT: 13.3 % (ref 10–15)
HBA1C MFR BLD: 5.8 % (ref 0–5.6)
HCT VFR BLD AUTO: 41.6 % (ref 40–53)
HGB BLD-MCNC: 13.9 G/DL (ref 13.3–17.7)
MCH RBC QN AUTO: 29.4 PG (ref 26.5–33)
MCHC RBC AUTO-ENTMCNC: 33.4 G/DL (ref 31.5–36.5)
MCV RBC AUTO: 88 FL (ref 78–100)
PLATELET # BLD AUTO: 155 10E9/L (ref 150–450)
RBC # BLD AUTO: 4.72 10E12/L (ref 4.4–5.9)
WBC # BLD AUTO: 4.3 10E9/L (ref 4–11)

## 2018-11-27 PROCEDURE — 99213 OFFICE O/P EST LOW 20 MIN: CPT | Mod: 25 | Performed by: INTERNAL MEDICINE

## 2018-11-27 PROCEDURE — 36415 COLL VENOUS BLD VENIPUNCTURE: CPT | Performed by: INTERNAL MEDICINE

## 2018-11-27 PROCEDURE — 84439 ASSAY OF FREE THYROXINE: CPT | Performed by: INTERNAL MEDICINE

## 2018-11-27 PROCEDURE — 83036 HEMOGLOBIN GLYCOSYLATED A1C: CPT | Performed by: INTERNAL MEDICINE

## 2018-11-27 PROCEDURE — G0103 PSA SCREENING: HCPCS | Performed by: INTERNAL MEDICINE

## 2018-11-27 PROCEDURE — 80053 COMPREHEN METABOLIC PANEL: CPT | Performed by: INTERNAL MEDICINE

## 2018-11-27 PROCEDURE — 99396 PREV VISIT EST AGE 40-64: CPT | Performed by: INTERNAL MEDICINE

## 2018-11-27 PROCEDURE — 84443 ASSAY THYROID STIM HORMONE: CPT | Performed by: INTERNAL MEDICINE

## 2018-11-27 PROCEDURE — 85027 COMPLETE CBC AUTOMATED: CPT | Performed by: INTERNAL MEDICINE

## 2018-11-27 PROCEDURE — 80061 LIPID PANEL: CPT | Performed by: INTERNAL MEDICINE

## 2018-11-27 RX ORDER — LEVOTHYROXINE SODIUM 112 UG/1
112 TABLET ORAL DAILY
Qty: 90 TABLET | Refills: 1 | Status: SHIPPED | OUTPATIENT
Start: 2018-11-27 | End: 2018-11-29

## 2018-11-27 RX ORDER — VALACYCLOVIR HYDROCHLORIDE 1 G/1
2000 TABLET, FILM COATED ORAL 2 TIMES DAILY
Qty: 4 TABLET | Refills: 3 | Status: SHIPPED | OUTPATIENT
Start: 2018-11-27 | End: 2019-02-14

## 2018-11-27 RX ORDER — PANTOPRAZOLE SODIUM 40 MG/1
40 TABLET, DELAYED RELEASE ORAL DAILY
Qty: 90 TABLET | Refills: 1 | Status: SHIPPED | OUTPATIENT
Start: 2018-11-27 | End: 2018-12-18

## 2018-11-27 NOTE — PATIENT INSTRUCTIONS
Preventive Health Recommendations  Male Ages 40 to 49    Yearly exam:             See your health care provider every year in order to  o   Review health changes.   o   Discuss preventive care.    o   Review your medicines if your doctor has prescribed any.    You should be tested each year for STDs (sexually transmitted diseases) if you re at risk.     Have a cholesterol test every 5 years.     Have a colonoscopy (test for colon cancer) if someone in your family has had colon cancer or polyps before age 50.     After age 45, have a diabetes test (fasting glucose). If you are at risk for diabetes, you should have this test every 3 years.      Talk with your health care provider about whether or not a prostate cancer screening test (PSA) is right for you.    Shots: Get a flu shot each year. Get a tetanus shot every 10 years.     Nutrition:    Eat at least 5 servings of fruits and vegetables daily.     Eat whole-grain bread, whole-wheat pasta and brown rice instead of white grains and rice.     Get adequate Calcium and Vitamin D.     Lifestyle    Exercise for at least 150 minutes a week (30 minutes a day, 5 days a week). This will help you control your weight and prevent disease.     Limit alcohol to one drink per day.     No smoking.     Wear sunscreen to prevent skin cancer.     See your dentist every six months for an exam and cleaning.      Labs today  I refilled your prescriptions  Use Valtrex with flare ups of cold sores  Start taking Protonix( pantoprazole) 40 mg daily to help your stomach  Schedule an appointment for upper GI endoscopy  Schedule appointment for physical therapy  Follow up in 2 months  Seek sooner medical attention if there is any worsening of symptoms or problems

## 2018-11-27 NOTE — PROGRESS NOTES
SUBJECTIVE:   CC: Sandra Cates is an 42 year old male who presents for preventive health visit.     Healthy Habits:    Do you get at least three servings of calcium containing foods daily (dairy, green leafy vegetables, etc.)? yes    Amount of exercise or daily activities, outside of work: 7 day(s) per week    Problems taking medications regularly No    Medication side effects: No    Have you had an eye exam in the past two years? no    Do you see a dentist twice per year? no    Do you have sleep apnea, excessive snoring or daytime drowsiness?no    Cold sores  Reports he's getting cold sores more often  He used to get it only during stressful situation  However, it's becoming more frequent lately    Acid reflux  Patient reports that he's had more reflux lately  He has avoided food for some time due to severity of symptoms  Has never done endoscopy  Hasn't tried to take anything to alleviate his symptoms    Knee pain  Reports he has a lot of knee pain  He uses NSAIDs to help his pain  He's an avid runner    Smoking history  He started smoking since [age]w [age]  He quit last year  Used to smoke 0.5 packs daily    Today's PHQ-2 Score:   PHQ-2 ( 1999 Pfizer) 11/27/2018 10/12/2018   Q1: Little interest or pleasure in doing things 0 0   Q2: Feeling down, depressed or hopeless 0 0   PHQ-2 Score 0 0       Abuse: Current or Past(Physical, Sexual or Emotional)- No  Do you feel safe in your environment? Yes    Social History   Substance Use Topics     Smoking status: Former Smoker     Packs/day: 0.50     Years: 25.00     Smokeless tobacco: Never Used      Comment: 1/2015 using Chantix, last cigarette 1st week of January 2015     Alcohol use No     If you drink alcohol do you typically have >3 drinks per day or >7 drinks per week? No                      Last PSA:   PSA   Date Value Ref Range Status   08/28/2017 0.73 0 - 4 ug/L Final     Comment:     Assay Method:  Chemiluminescence using Siemens Vista analyzer        Reviewed orders with patient. Reviewed health maintenance and updated orders accordingly - Yes  Labs reviewed in EPIC  Patient Active Problem List   Diagnosis     CARDIOVASCULAR SCREENING; LDL GOAL LESS THAN 160     Lactose intolerance     Elevated liver enzymes     Hashimoto's thyroiditis     FH: prostate cancer     Tear of medial cartilage or meniscus of knee, current     Anti-TPO antibodies present     Hypothyroidism     No past surgical history on file.    Social History   Substance Use Topics     Smoking status: Former Smoker     Packs/day: 0.50     Years: 25.00     Smokeless tobacco: Never Used      Comment: 1/2015 using Chantix, last cigarette 1st week of January 2015     Alcohol use No     Family History   Problem Relation Age of Onset     Coronary Artery Disease Mother      Prostate Cancer Father          Current Outpatient Prescriptions   Medication Sig Dispense Refill     levothyroxine (SYNTHROID/LEVOTHROID) 112 MCG tablet Take 1 tablet (112 mcg) by mouth daily 90 tablet 1     pantoprazole (PROTONIX) 40 MG EC tablet Take 1 tablet (40 mg) by mouth daily Take 30-60 minutes before a meal. 90 tablet 1     valACYclovir (VALTREX) 1000 mg tablet Take 2 tablets (2,000 mg) by mouth 2 times daily As needed for coldsore 4 tablet 3     [DISCONTINUED] levothyroxine (SYNTHROID/LEVOTHROID) 112 MCG tablet TAKE 1 TABLET (112 MCG) BY MOUTH DAILY 30 tablet 0     Allergies   Allergen Reactions     No Known Allergies      Milk Protein Extract Hives and Rash       Reviewed and updated as needed this visit by clinical staff  Tobacco  Allergies  Meds         Reviewed and updated as needed this visit by Provider        ROS:  CONSTITUTIONAL: NEGATIVE for fever, chills, change in weight  INTEGUMENTARY/SKIN: NEGATIVE for worrisome rashes, moles or lesions  EYES: NEGATIVE for vision changes or irritation  ENT: NEGATIVE for ear, mouth and throat problems  RESP: NEGATIVE for significant cough or SOB  CV: NEGATIVE for chest  "pain, palpitations or peripheral edema  GI: POSiTIVE for heart burn NEGATIVE for nausea, abdominal pain, or change in bowel habits   male: negative for dysuria, hematuria, decreased urinary stream, erectile dysfunction, urethral discharge  MUSCULOSKELETAL: POSITIVE for bilateral knee pain  NEURO: NEGATIVE for weakness, dizziness or paresthesias  PSYCHIATRIC: NEGATIVE for changes in mood or affect  Declined for STD screening  This document serves as a record of the services and decisions personally performed and made by Ericka Ortega MD. It was created on her behalf by Vani Whitfield, a trained medical scribe. The creation of this document is based on the provider's statements to the medical scribe.  Vani Whitfield 3:18 PM November 27, 2018    OBJECTIVE:   /74 (BP Location: Right arm, Patient Position: Sitting, Cuff Size: Adult Large)  Pulse 56  Temp 97  F (36.1  C) (Oral)  Ht 1.956 m (6' 5\")  Wt 91.2 kg (201 lb)  SpO2 98%  BMI 23.84 kg/m2  EXAM:  GENERAL: healthy, alert and no distress  EYES: Eyes grossly normal to inspection, PERRL and conjunctivae and sclerae normal  HENT: ear canals and TM's normal, nose and mouth without ulcers or lesions  NECK: no adenopathy, no asymmetry, masses, or scars and thyroid normal to palpation  RESP: lungs clear to auscultation - no rales, rhonchi or wheezes  CV: regular rate and rhythm, normal S1 S2, no S3 or S4, no murmur, click or rub, no peripheral edema and peripheral pulses strong  ABDOMEN: soft, nontender, no hepatosplenomegaly, no masses and bowel sounds normal   (male): normal male genitalia without lesions or urethral discharge, no hernia  MS: no gross musculoskeletal defects noted, no edema  Gait normal  Full range of motion of both knee joints  SKIN: small scar marks on both shins due to soccer injury  NEURO: Normal strength and tone, mentation intact and speech normal  PSYCH: mentation appears normal, affect normal/bright    Diagnostic Test " Results:  No results found for this or any previous visit (from the past 24 hour(s)).    ASSESSMENT/PLAN:   Sandra was seen today for physical.    Diagnoses and all orders for this visit:    Routine history and physical examination of adult  Patient came in today for a wellness visit  Educated patient about difference between wellness visit and office visit  Labs ordered today    Hashimoto's thyroiditis  -     TSH WITH FREE T4 REFLEX  -     levothyroxine (SYNTHROID/LEVOTHROID) 112 MCG tablet; Take 1 tablet (112 mcg) by mouth daily  -     Cancel: **TSH with free T4 reflex FUTURE 2mo  Doing well  Compliant with medication  I will let him know if he needs to change his dosage    Screening for deficiency anemia  -     CBC with platelets    Medication management  -     Comprehensive metabolic panel    Mixed hyperlipidemia  -     Lipid panel reflex to direct LDL Non-fasting    Elevated glucose  -     Hemoglobin A1c    Screening for prostate cancer  -     PSA, screen  Patient has family history of prostate cancer.    Recurrent cold sores  -     valACYclovir (VALTREX) 1000 mg tablet; Take 2 tablets (2,000 mg) by mouth 2 times daily As needed for coldsore  Patient reports his cold sores are more recurrent lately  Prescribed valacyclovir for flare ups  Educated him on how to use it    Epigastric pain  -     pantoprazole (PROTONIX) 40 MG EC tablet; Take 1 tablet (40 mg) by mouth daily Take 30-60 minutes before a meal.  -     GASTROENTEROLOGY ADULT REF PROCEDURE ONLY [facility] [phone]; Dr. DAVIDSON Holland  Patient reports he's always had acid reflux  It's been worse lately  He's been avoiding eating due to symptoms  He uses a lot of NSAIDs for knee pain  Advised against NSAIDs  Educated him that Tylenol is safe to use for pain  Prescribed pantoprazole    Ordered endoscopy  He will schedule it  Advised follow up in 2 months    Gastroesophageal reflux disease without esophagitis  -     pantoprazole (PROTONIX) 40 MG  "EC tablet; Take 1 tablet (40 mg) by mouth daily Take 30-60 minutes before a meal.  -     GASTROENTEROLOGY ADULT REF PROCEDURE ONLY [facility] [phone]; Dr. DAVIDSON Holland  See above    Acute pain of both knees  -     [facility] PT, HAND, AND CHIROPRACTIC REFERRAL; Future  Patient is a runner and plays soccer  He has been experiencing bilateral knee pain  No signs of any infection on exam  Uses NSAIDs to alleviate pain  Advised against them  Referred for PT  He will schedule appointment  If he doesn't improve, I will refer to orthopedics      Advised adequate calcium and vitamin D supplements  Patient Instructions   Labs today  I refilled your prescriptions  Use Valtrex with flare ups of cold sores  Start taking Protonix( pantoprazole) 40 mg daily to help your stomach  Schedule an appointment for upper GI endoscopy  Schedule appointment for physical therapy  Follow up in 2 months  Seek sooner medical attention if there is any worsening of symptoms or problems    COUNSELING:  Reviewed preventive health counseling, as reflected in patient instructions       Regular exercise       Healthy diet/nutrition    BP Readings from Last 1 Encounters:   11/27/18 122/74     Estimated body mass index is 23.84 kg/(m^2) as calculated from the following:    Height as of this encounter: 1.956 m (6' 5\").    Weight as of this encounter: 91.2 kg (201 lb).      reports that he has quit smoking. He has a 12.50 pack-year smoking history. He has never used smokeless tobacco.    Counseling Resources:  ATP IV Guidelines  Pooled Cohorts Equation Calculator  FRAX Risk Assessment  ICSI Preventive Guidelines  Dietary Guidelines for Americans, 2010  USDA's MyPlate  ASA Prophylaxis  Lung CA Screening    The information in this document, created by the medical scribe for me, accurately reflects the services I personally performed and the decisions made by me. I have reviewed and approved this document for accuracy prior to leaving the patient care " area.  November 27, 2018 3:42 PM    Ericka Ortega MD  [facility]

## 2018-11-27 NOTE — MR AVS SNAPSHOT
After Visit Summary   11/27/2018    Sandra Cates    MRN: [id number]           Patient Information     Date Of Birth          1975        Visit Information        Provider Department      11/27/2018 3:00 PM Ericka Ortega MD [facility]        Today's Diagnoses     Routine history and physical examination of adult    -  1    Hashimoto's thyroiditis        Screening for deficiency anemia        Medication management        Mixed hyperlipidemia        Elevated glucose        Screening for prostate cancer        Recurrent cold sores        Epigastric pain        Gastroesophageal reflux disease without esophagitis        Acute pain of both knees          Care Instructions      Preventive Health Recommendations  Male Ages 40 to 49    Yearly exam:             See your health care provider every year in order to  o   Review health changes.   o   Discuss preventive care.    o   Review your medicines if your doctor has prescribed any.    You should be tested each year for STDs (sexually transmitted diseases) if you re at risk.     Have a cholesterol test every 5 years.     Have a colonoscopy (test for colon cancer) if someone in your family has had colon cancer or polyps before age 50.     After age 45, have a diabetes test (fasting glucose). If you are at risk for diabetes, you should have this test every 3 years.      Talk with your health care provider about whether or not a prostate cancer screening test (PSA) is right for you.    Shots: Get a flu shot each year. Get a tetanus shot every 10 years.     Nutrition:    Eat at least 5 servings of fruits and vegetables daily.     Eat whole-grain bread, whole-wheat pasta and brown rice instead of white grains and rice.     Get adequate Calcium and Vitamin D.     Lifestyle    Exercise for at least 150 minutes a week (30 minutes a day, 5 days a week). This will help you control your weight and prevent disease.     Limit alcohol to one drink  per day.     No smoking.     Wear sunscreen to prevent skin cancer.     See your dentist every six months for an exam and cleaning.      Labs today  I refilled your prescriptions  Use Valtrex with flare ups of cold sores  Start taking Protonix( pantoprazole) 40 mg daily to help your stomach  Schedule an appointment for upper GI endoscopy  Schedule appointment for physical therapy  Follow up in 2 months  Seek sooner medical attention if there is any worsening of symptoms or problems          Follow-ups after your visit        Additional Services     GASTROENTEROLOGY ADULT REF PROCEDURE ONLY [facility] [phone]; Dr. DAVIDSON [facility]N PT, HAND, AND CHIROPRACTIC REFERRAL       Physical Therapy, Hand Therapy and Chiropractic Care are available through:  *[facility]  *Hand Therapy (Occupational Therapy or Physical Therapy)  *[facility]    Call one number to schedule at any of the above locations: [phone].    Physical therapy, Hand therapy and/or Chiropractic care has been recommended by your physician as an excellent treatment option to reduce pain and help people return to normal activities, including sports.  Therapy and/or chiropractic care services are a great complement or alternative to expensive and invasive surgery, injections, or long-term use of prescription medications. The primary goal is to identify the underlying problem and provide you the tools to manage your condition on your own.     Please be aware that coverage of these services is subject to the terms and limitations of your health insurance plan.  Call member services at your health plan with any benefit or coverage questions.      Please bring the following to your appointment:  *Your personal calendar for scheduling future appointments  *Comfortable clothing                  Follow-up notes from your care team     Return in about 2 months (around 1/27/2019) for medication  "follow up knee pain, Hypothyroidism, GERd.      Future tests that were ordered for you today     Open Future Orders        Priority Expected Expires Ordered    [facility], HAND, AND CHIROPRACTIC REFERRAL Routine  11/27/2019 11/27/2018            Who to contact     If you have questions or need follow up information about today's clinic visit or your schedule please contact [facility] directly at [phone].  Normal or non-critical lab and imaging results will be communicated to you by Un[employer]in[employer], letter or phone within 4 business days after the clinic has received the results. If you do not hear from us within 7 days, please contact the clinic through App[employer]st or phone. If you have a critical or abnormal lab result, we will notify you by phone as soon as possible.  Submit refill requests through [facility] or call your pharmacy and they will forward the refill request to us. Please allow 3 business days for your refill to be completed.          Additional Information About Your Visit        [employer] Information     [facility] gives you secure access to your electronic health record. If you see a primary care provider, you can also send messages to your care team and make appointments. If you have questions, please call your primary care clinic.  If you do not have a primary care provider, please call [phone] and they will assist you.        Care EveryWhere ID     This is your Care EveryWhere ID. This could be used by other organizations to access your [place] medical records  [id number]        Your Vitals Were     Pulse Temperature Height Pulse Oximetry BMI (Body Mass Index)       56 97  F (36.1  C) (Oral) 6' 5\" (1.956 m) 98% 23.84 kg/m2        Blood Pressure from Last 3 Encounters:   11/27/18 122/74   10/12/18 114/70   06/06/18 119/80    Weight from Last 3 Encounters:   11/27/18 201 lb (91.2 kg)   10/12/18 196 lb (88.9 kg)   06/06/18 200 lb 8 oz (90.9 kg)              We Performed the Following     CBC with " platelets     Comprehensive metabolic panel     GASTROENTEROLOGY ADULT REF PROCEDURE ONLY [facility] [phone]; Dr. DAVIDSON Holland     Hemoglobin A1c     Lipid panel reflex to direct LDL Non-fasting     PSA, screen     TSH WITH FREE T4 REFLEX          Today's Medication Changes          These changes are accurate as of 11/27/18  3:41 PM.  If you have any questions, ask your nurse or doctor.               Start taking these medicines.        Dose/Directions    pantoprazole 40 MG EC tablet   Commonly known as:  PROTONIX   Used for:  Epigastric pain, Gastroesophageal reflux disease without esophagitis   Started by:  Ericka Ortega MD        Dose:  40 mg   Take 1 tablet (40 mg) by mouth daily Take 30-60 minutes before a meal.   Quantity:  90 tablet   Refills:  1       valACYclovir 1000 mg tablet   Commonly known as:  VALTREX   Used for:  Recurrent cold sores   Started by:  Ericka Ortega MD        Dose:  2000 mg   Take 2 tablets (2,000 mg) by mouth 2 times daily As needed for coldsore   Quantity:  4 tablet   Refills:  3            Where to get your medicines      These medications were sent to [facility]/pharmacy #[id number] - [place] [id number] [address]     Phone:  [phone]     levothyroxine 112 MCG tablet    pantoprazole 40 MG EC tablet    valACYclovir 1000 mg tablet                Primary Care Provider Office Phone # Fax #    Ericka Ortega [phone] [address] [facility] [address]  [facility] [address]        Equal Access to Services     ISABEL BENNETT AH: Hadii reyes Prater, waaxda lujennifer, qaybta kaalmada dank, waxay gisell mcclendon. So [facility] [phone].    ATENCIÓN: Si habla español, tiene a fuller disposición servicios gratuitos de asistencia lingüística. Llame al [phone].    We comply with applicable federal civil rights laws and [place] laws. We do not discriminate on the basis of race,  color, national origin, age, disability, sex, sexual orientation, or gender identity.            Thank you!     Thank you for choosing [facility]  for your care. Our goal is always to provide you with excellent care. Hearing back from our patients is one way we can continue to improve our services. Please take a few minutes to complete the written survey that you may receive in the mail after your visit with us. Thank you!             Your Updated Medication List - Protect others around you: Learn how to safely use, store and throw away your medicines at www.disposemymeds.org.          This list is accurate as of 11/27/18  3:41 PM.  Always use your most recent med list.                   Brand Name Dispense Instructions for use Diagnosis    levothyroxine 112 MCG tablet    SYNTHROID/LEVOTHROID    90 tablet    Take 1 tablet (112 mcg) by mouth daily    Hashimoto's thyroiditis       pantoprazole 40 MG EC tablet    PROTONIX    90 tablet    Take 1 tablet (40 mg) by mouth daily Take 30-60 minutes before a meal.    Epigastric pain, Gastroesophageal reflux disease without esophagitis       valACYclovir 1000 mg tablet    VALTREX    4 tablet    Take 2 tablets (2,000 mg) by mouth 2 times daily As needed for coldsore    Recurrent cold sores

## 2018-11-28 LAB
ALBUMIN SERPL-MCNC: 4.2 G/DL (ref 3.4–5)
ALP SERPL-CCNC: 52 U/L (ref 40–150)
ALT SERPL W P-5'-P-CCNC: 36 U/L (ref 0–70)
ANION GAP SERPL CALCULATED.3IONS-SCNC: 6 MMOL/L (ref 3–14)
AST SERPL W P-5'-P-CCNC: 27 U/L (ref 0–45)
BILIRUB SERPL-MCNC: 0.7 MG/DL (ref 0.2–1.3)
BUN SERPL-MCNC: 20 MG/DL (ref 7–30)
CALCIUM SERPL-MCNC: 9.4 MG/DL (ref 8.5–10.1)
CHLORIDE SERPL-SCNC: 104 MMOL/L (ref 94–109)
CHOLEST SERPL-MCNC: 181 MG/DL
CO2 SERPL-SCNC: 29 MMOL/L (ref 20–32)
CREAT SERPL-MCNC: 0.97 MG/DL (ref 0.66–1.25)
GFR SERPL CREATININE-BSD FRML MDRD: 85 ML/MIN/1.7M2
GLUCOSE SERPL-MCNC: 89 MG/DL (ref 70–99)
HDLC SERPL-MCNC: 47 MG/DL
LDLC SERPL CALC-MCNC: 123 MG/DL
NONHDLC SERPL-MCNC: 134 MG/DL
POTASSIUM SERPL-SCNC: 3.9 MMOL/L (ref 3.4–5.3)
PROT SERPL-MCNC: 7.5 G/DL (ref 6.8–8.8)
PSA SERPL-ACNC: 0.92 UG/L (ref 0–4)
SODIUM SERPL-SCNC: 139 MMOL/L (ref 133–144)
T4 FREE SERPL-MCNC: 1.08 NG/DL (ref 0.76–1.46)
TRIGL SERPL-MCNC: 57 MG/DL
TSH SERPL DL<=0.005 MIU/L-ACNC: 7.2 MU/L (ref 0.4–4)

## 2018-11-29 DIAGNOSIS — E06.3 HASHIMOTO'S THYROIDITIS: ICD-10-CM

## 2018-11-29 RX ORDER — LEVOTHYROXINE SODIUM 125 UG/1
125 TABLET ORAL DAILY
Qty: 90 TABLET | Refills: 0 | Status: SHIPPED | OUTPATIENT
Start: 2018-11-29 | End: 2019-02-16

## 2018-11-30 NOTE — PROGRESS NOTES
Kelton Flores,    This is to inform you regarding your test result.    I spoke to you on phone but sending you a result note also.  TSH which is thyroid hormone is elevated   Increase levothyroxine to 125  mcg po daily  You ar on 112 mcg currently so we are increasing to 125 mcg po daily.  Please make lab appointment to recheck TSH in 2 months.  Orders are placed.  New script is sent  Your total cholesterol is normal.  HDL which is called good cholesterol is normal.  Your LDL cholesterol is elevated  Your triglycerides are normal.  Eat low cholesterol low fat  diet and do regular physical activity.  The testing of your blood sugar, kidney function, liver function and electrolytes was normal.  Your PSA (prostate cancer screening test)  is normal.  HbA1c which is average glucose during last 3 months is 5.8%  You are considered prediabetic  If you desire we can refer you to dietician.  Please let us know so we can do referral for you to see the dietician.  CBC result which includes white count Hemoglobin and  Platelet Counts is normal.           Sincerely,      Dr.Nasima Shannon MD,FACP

## 2018-12-14 ENCOUNTER — APPOINTMENT (OUTPATIENT)
Dept: SURGERY | Facility: PHYSICIAN GROUP | Age: 43
End: 2018-12-14
Payer: COMMERCIAL

## 2018-12-14 ENCOUNTER — HOSPITAL ENCOUNTER (OUTPATIENT)
Facility: CLINIC | Age: 43
Discharge: HOME | End: 2018-12-14
Attending: SURGERY | Admitting: SURGERY
Payer: COMMERCIAL

## 2018-12-14 VITALS
OXYGEN SATURATION: 97 % | DIASTOLIC BLOOD PRESSURE: 92 MMHG | SYSTOLIC BLOOD PRESSURE: 115 MMHG | RESPIRATION RATE: 42 BRPM

## 2018-12-14 DIAGNOSIS — K29.00 ACUTE SUPERFICIAL GASTRITIS WITHOUT HEMORRHAGE: Primary | ICD-10-CM

## 2018-12-14 LAB — UPPER GI ENDOSCOPY: NORMAL

## 2018-12-14 PROCEDURE — G0500 MOD SEDAT ENDO SERVICE >5YRS: HCPCS

## 2018-12-14 PROCEDURE — 43239 EGD BIOPSY SINGLE/MULTIPLE: CPT | Performed by: SURGERY

## 2018-12-14 PROCEDURE — 99152 MOD SED SAME PHYS/QHP 5/>YRS: CPT | Performed by: SURGERY

## 2018-12-14 PROCEDURE — 88305 TISSUE EXAM BY PATHOLOGIST: CPT | Performed by: SURGERY

## 2018-12-14 PROCEDURE — 25000125 ZZHC RX 250: Performed by: SURGERY

## 2018-12-14 PROCEDURE — 25000128 H RX IP 250 OP 636: Performed by: SURGERY

## 2018-12-14 PROCEDURE — 88305 TISSUE EXAM BY PATHOLOGIST: CPT | Mod: 26 | Performed by: SURGERY

## 2018-12-14 RX ORDER — LIDOCAINE 40 MG/G
CREAM TOPICAL
Status: DISCONTINUED | OUTPATIENT
Start: 2018-12-14 | End: 2018-12-14 | Stop reason: HOSPADM

## 2018-12-14 RX ORDER — ONDANSETRON 2 MG/ML
4 INJECTION INTRAMUSCULAR; INTRAVENOUS
Status: DISCONTINUED | OUTPATIENT
Start: 2018-12-14 | End: 2018-12-14 | Stop reason: HOSPADM

## 2018-12-14 RX ORDER — OMEPRAZOLE 40 MG/1
40 CAPSULE, DELAYED RELEASE ORAL DAILY
Qty: 30 CAPSULE | Refills: 0 | Status: SHIPPED | OUTPATIENT
Start: 2018-12-14 | End: 2018-12-18 | Stop reason: ALTCHOICE

## 2018-12-14 RX ORDER — SUCRALFATE 1 G/1
1 TABLET ORAL 2 TIMES DAILY
Qty: 60 TABLET | Refills: 0 | Status: SHIPPED | OUTPATIENT
Start: 2018-12-14 | End: 2018-12-18 | Stop reason: ALTCHOICE

## 2018-12-14 RX ORDER — FENTANYL CITRATE 50 UG/ML
INJECTION, SOLUTION INTRAMUSCULAR; INTRAVENOUS PRN
Status: DISCONTINUED | OUTPATIENT
Start: 2018-12-14 | End: 2018-12-14 | Stop reason: HOSPADM

## 2018-12-17 ENCOUNTER — TELEPHONE (OUTPATIENT)
Dept: FAMILY MEDICINE | Facility: CLINIC | Age: 43
End: 2018-12-17

## 2018-12-17 LAB — COPATH REPORT: NORMAL

## 2018-12-17 NOTE — TELEPHONE ENCOUNTER
Pt has been taking protonix which was prescribed by . Pt went to the gastroenterologist. The gastro doctor prescribed omeprazole and carafate. Pt wants to know whether to take both the omeprazole and the protonix together or just one of the medications? Pt advised to contact the gastroenterologist to get guidance on how to proceed.

## 2018-12-17 NOTE — TELEPHONE ENCOUNTER
Reason for Call:  Other returning call and prescription    Detailed comments: patient wants to be clear on what meds he should be taking please call    Phone Number Patient can be reached at: Home number on file [phone] (home)    Best Time: any    Can we leave a detailed message on this number? YES    Call taken on 12/17/2018 at 12:05 PM by Emelyn Solares

## 2018-12-17 NOTE — TELEPHONE ENCOUNTER
Triage ,  Please speak to this patient and find out what his concerns are about the medication ?  Dr.Nasima Shannon MD

## 2018-12-18 ENCOUNTER — TELEPHONE (OUTPATIENT)
Dept: FAMILY MEDICINE | Facility: CLINIC | Age: 43
End: 2018-12-18

## 2018-12-18 ENCOUNTER — TELEPHONE (OUTPATIENT)
Dept: SURGERY | Facility: CLINIC | Age: 43
End: 2018-12-18

## 2018-12-18 DIAGNOSIS — K21.9 GASTROESOPHAGEAL REFLUX DISEASE WITHOUT ESOPHAGITIS: ICD-10-CM

## 2018-12-18 DIAGNOSIS — R10.13 EPIGASTRIC PAIN: ICD-10-CM

## 2018-12-18 DIAGNOSIS — Z86.19 HISTORY OF HELICOBACTER PYLORI INFECTION: Primary | ICD-10-CM

## 2018-12-18 RX ORDER — CLARITHROMYCIN 500 MG
500 TABLET ORAL 2 TIMES DAILY
Qty: 28 TABLET | Refills: 0 | Status: SHIPPED | OUTPATIENT
Start: 2018-12-18 | End: 2019-02-14

## 2018-12-18 RX ORDER — PANTOPRAZOLE SODIUM 40 MG/1
40 TABLET, DELAYED RELEASE ORAL
Qty: 28 TABLET | Refills: 0 | Status: SHIPPED | OUTPATIENT
Start: 2018-12-18 | End: 2019-02-14

## 2018-12-18 RX ORDER — METRONIDAZOLE 500 MG/1
500 TABLET ORAL 3 TIMES DAILY
Qty: 42 TABLET | Refills: 0 | Status: SHIPPED | OUTPATIENT
Start: 2018-12-18 | End: 2019-02-14

## 2018-12-18 NOTE — TELEPHONE ENCOUNTER
Reason for Call:  Other call back    Detailed comments:   PT was dx with H Pylori and is asking Dr. Ortega what he should do as far as care from here?      Phone Number Patient can be reached at: Home number on file [phone] (home)    Best Time: Any     Can we leave a detailed message on this number? YES    Call taken on 12/18/2018 at 1:21 PM by Juanis Buckley

## 2018-12-18 NOTE — TELEPHONE ENCOUNTER
I spoke to patient and answered his questions  I am treating him for H.pylori    The regimen is     Clarithromycin 500 mg twice daily for 14 days  Metronidazole 500 mg three times daily for 14 days  Pantoprazole 40 mg twice daily for 14 days then once daily    Stop omeprazole and Carafate     Follow up with me in 1-2 weeks after finishing treatment    Script is sent to [facility] on his request  Dr.Nasima Shannon MD

## 2018-12-18 NOTE — TELEPHONE ENCOUNTER
Called patient to inform him that his EGD pathology came back positive for H-Pylori bacteria and per Dr. Douglas's recommendations, he should reach out to his primary provider to discuss treatment recommendations regarding this.    He agreed.    His primary put him on protonix and Dr. Douglas put him on prilosec.  He is wondering if he should be taking both of them.  As the are both proton pump inhibitors, recommended him to discuss this with his primary as well to decide if it is warranted given his presenting symptoms.    He agreed.    Dixie Lemos RN

## 2018-12-19 NOTE — TELEPHONE ENCOUNTER
This is a feedback for your staff.    I understand this but your staff should have discussed that with me ist before notifying the patient   They told the patient that you have H.Pylori and contact your primary.  He had question about Prilosec and pantoprazole and they told him to contact primary.  I prescribed one PPI and you did the other one. so patient was not sure which one to take  This happened before he got the result of H.Pylori and staff told him to contact primary.  When he called my office I did not know anything about this .  I had to review records to figure this out     Thanks a lot  Dr.Nasima Shannon MD

## 2019-02-14 ENCOUNTER — OFFICE VISIT (OUTPATIENT)
Dept: FAMILY MEDICINE | Facility: CLINIC | Age: 44
End: 2019-02-14
Payer: COMMERCIAL

## 2019-02-14 VITALS
TEMPERATURE: 97.6 F | DIASTOLIC BLOOD PRESSURE: 71 MMHG | OXYGEN SATURATION: 100 % | WEIGHT: 206.4 LBS | HEIGHT: 77 IN | HEART RATE: 54 BPM | SYSTOLIC BLOOD PRESSURE: 118 MMHG | BODY MASS INDEX: 24.37 KG/M2

## 2019-02-14 DIAGNOSIS — K21.9 GASTROESOPHAGEAL REFLUX DISEASE WITHOUT ESOPHAGITIS: ICD-10-CM

## 2019-02-14 DIAGNOSIS — R73.03 PREDIABETES: ICD-10-CM

## 2019-02-14 DIAGNOSIS — B00.1 RECURRENT COLD SORES: ICD-10-CM

## 2019-02-14 DIAGNOSIS — E03.9 HYPOTHYROIDISM, UNSPECIFIED TYPE: Primary | ICD-10-CM

## 2019-02-14 DIAGNOSIS — R10.13 EPIGASTRIC PAIN: ICD-10-CM

## 2019-02-14 LAB — HBA1C MFR BLD: 5.5 % (ref 0–5.6)

## 2019-02-14 PROCEDURE — 83036 HEMOGLOBIN GLYCOSYLATED A1C: CPT | Performed by: INTERNAL MEDICINE

## 2019-02-14 PROCEDURE — 36415 COLL VENOUS BLD VENIPUNCTURE: CPT | Performed by: INTERNAL MEDICINE

## 2019-02-14 PROCEDURE — 99214 OFFICE O/P EST MOD 30 MIN: CPT | Performed by: INTERNAL MEDICINE

## 2019-02-14 PROCEDURE — 84443 ASSAY THYROID STIM HORMONE: CPT | Performed by: INTERNAL MEDICINE

## 2019-02-14 RX ORDER — PANTOPRAZOLE SODIUM 40 MG/1
40 TABLET, DELAYED RELEASE ORAL DAILY
Qty: 90 TABLET | Refills: 1 | Status: SHIPPED | OUTPATIENT
Start: 2019-02-14 | End: 2024-06-06

## 2019-02-14 RX ORDER — VALACYCLOVIR HYDROCHLORIDE 1 G/1
2000 TABLET, FILM COATED ORAL 2 TIMES DAILY
Qty: 12 TABLET | Refills: 3 | Status: SHIPPED | OUTPATIENT
Start: 2019-02-14 | End: 2020-03-15

## 2019-02-14 ASSESSMENT — MIFFLIN-ST. JEOR: SCORE: 1953.6

## 2019-02-14 NOTE — PROGRESS NOTES
"  SUBJECTIVE:   Sandra Cates is a 43 year old male who presents to clinic today for the following health issues:    Patient was 25 minutes late to his appointment    Abdominal symptoms  Patient has history of H. Pylori  He also has GERD  He reports bloating, abdominal pain  Reports that he has poor appetite  He's compliant with dietary recommendations for people with GERD    Problem list and histories reviewed & adjusted, as indicated.  Additional history: as documented    Medications and labs reviewed in EPIC    Reviewed and updated as needed this visit by clinical staff  Tobacco  Allergies  Meds       Reviewed and updated as needed this visit by Provider       ROS:  Constitutional, HEENT, cardiovascular, pulmonary, GI, , musculoskeletal, neuro, skin, endocrine and psych systems are negative, except as otherwise noted.    This document serves as a record of the services and decisions personally performed and made by Ericka Ortega MD. It was created on her behalf by Vani Whitfield, a trained medical scribe. The creation of this document is based on the provider's statements to the medical scribe.  Vani Whitfield 4:08 PM February 14, 2019    OBJECTIVE:     /71   Pulse 54   Temp 97.6  F (36.4  C) (Tympanic)   Ht 1.956 m (6' 5\")   Wt 93.6 kg (206 lb 6.4 oz)   SpO2 100%   BMI 24.48 kg/m    Body mass index is 24.48 kg/m .  GENERAL: healthy, alert and no distress  PSYCH: mentation appears normal, affect normal/bright    Diagnostic Test Results:  No results found for this or any previous visit (from the past 24 hour(s)).    Reviewed and discussed upper GI endoscopy done on 12/14/18  Reviewed and discussed labs done on  11/27/18    ASSESSMENT/PLAN:     Sandra was seen today for recheck medication.    Diagnoses and all orders for this visit:    Hypothyroidism, unspecified type  -     TSH with free T4 reflex  Doing well  Compliant with medication    Epigastric pain  -     pantoprazole (PROTONIX) " 40 MG EC tablet; Take 1 tablet (40 mg) by mouth daily Take 30-60 minutes before a meal.  Patient reports epigastric pain and bloating  He has GERD and history of H. Pylori  He was treated for H. pylori  Advised compliance with pantoprazole  Advised using ranitidine at night  Educated patient about recommendations to help reduce acid reflux  Advised dietary changes to help his acid reflux  Information about GERD and treatment options was provided to patient in office visit today  Ordered stool study  If he has H. Pylori again, we will treat him with different course of antibiotics  If symptoms don't improve, will refer to GI specialist    Gastroesophageal reflux disease without esophagitis  -     pantoprazole (PROTONIX) 40 MG EC tablet; Take 1 tablet (40 mg) by mouth daily Take 30-60 minutes before a meal.  -     H Pylori antigen, stool; Future  -     ranitidine (ZANTAC) 150 MG tablet; Take 1 tablet (150 mg) by mouth At Bedtime  See above    Recurrent cold sores  -     valACYclovir (VALTREX) 1000 mg tablet; Take 2 tablets (2,000 mg) by mouth 2 times daily For one day as needed for coldsore  Works well for flare ups    Prediabetes  -     Hemoglobin A1c  Advised healthy eating and exercise habits  Patient reports he eats well and exercises  Declined diabetic educator for now  Lab Results   Component Value Date    A1C 5.8 11/27/2018    A1C 6.0 01/22/2015     Patient Instructions   Labs today  Continue to take pantoprazole  Take ranitidine 150 mg at bedtime  Follow up in 3 months  Seek sooner medical attention if there is any worsening of symptoms or problems    The information in this document, created by the medical scribe for me, accurately reflects the services I personally performed and the decisions made by me. I have reviewed and approved this document for accuracy prior to leaving the patient care area.  February 14, 2019 4:24 PM    Ericka Ortega MD  [facility]

## 2019-02-14 NOTE — PATIENT INSTRUCTIONS
Labs today  Continue to take pantoprazole  Take ranitidine 150 mg at bedtime  Follow up in 3 months  Seek sooner medical attention if there is any worsening of symptoms or problems  Patient Education     Tips to Control Acid Reflux    To control acid reflux, you ll need to make some basic diet and lifestyle changes. The simple steps outlined below may be all you ll need to ease discomfort.  Watch what you eat    Avoid fatty foods and spicy foods.    Eat fewer acidic foods, such as citrus and tomato-based foods. These can increase symptoms.    Limit drinking alcohol, caffeine, and fizzy beverages. All increase acid reflux.    Try limiting chocolate, peppermint, and spearmint. These can worsen acid reflux in some people.  Watch when you eat    Avoid lying down for 3 hours after eating.    Do not snack before going to bed.  Raise your head  Raising your head and upper body by 4 to 6 inches helps limit reflux when you re lying down. Put blocks under the head of your bed frame to raise it.  Other changes    Lose weight, if you need to    Don t exercise near bedtime    Avoid tight-fitting clothes    Limit aspirin and ibuprofen    Stop smoking   Date Last Reviewed: 7/1/2016 2000-2018 [employer]. [address] [place] [id number]. All rights reserved. This information is not intended as a substitute for professional medical care. Always follow your healthcare professional's instructions.           Patient Education     Surgery for GERD (Fundoplication)  You have gastroesophageal reflux disease (GERD). This is a problem where food and fluid flow back (reflux) into your esophagus. Other treatments have not helped. Your healthcare provider is now recommending a surgery called fundoplication. This surgery is used most often when GERD causes complications or side effects. It s also used for people who don t handle medicines well. People who respond best to this surgery are those who did well using acid  blocker medicines first. Read on to learn more.        The top of the stomach is wrapped around the esophagus.         The wrap is permanently stitched in place. Two commonly used wraps are full and partial.      What the surgery does  The lower esophageal sphincter (LES) is a one-way valve at the top of the stomach. It keeps food and fluid from flowing backward. Your LES is weak. It does not close off the top of your stomach. This means that food and fluid is able to flow back into your esophagus. During fundoplication, the LES is restructured. This is done by wrapping the very top of the stomach around the lower part of the esophagus.  Two types  of surgery  The surgery is most often done with laparoscopy. But it may also be done with open surgery.  Laparoscopy. This surgery uses a few small cuts (incisions). A thin, lighted tube called a laparoscope is used. This scope lets the doctor see inside your body and work through the small incisions.  Open surgery. This surgery uses one large incision. The doctor sees and works through this incision. This method may be used if your doctor feels it isn t safe to continue with laparoscopic surgery.  During the surgery  An IV (intravenous) line is put into a vein in your arm or hand. This line gives you fluids and medicines. You are then given medicine (anesthesia) to so that you don t feel pain during surgery. Most often, general anesthesia is used. This puts you into a state like a deep sleep during the surgery. Below are the general steps once surgery begins.  For laparoscopy:    The doctor makes 2 to 4 small incisions in your belly area (abdomen). The scope is put through one of the incisions. The scope sends live pictures to a video screen. This lets the doctor see inside your abdomen.    Tiny surgical tools are placed through the other small incisions.    Your abdomen is filled with carbon dioxide. This gas provides space for the doctor to see and work.  For open  surgery:    One large incision is made.       The esophagus travels through an opening in the diaphragm called the hiatus. You have a hiatal hernia if your stomach has pulled up into the chest area. If this is the case, the hiatus is tightened with a few stitches.    The stomach is wrapped around the outside of the esophagus. The wrap is stitched into place.    When the surgery is done, all tools are removed. Any incisions are closed with stitches (sutures) or staples.  Risks and complications of fundoplication    Injury to the liver, spleen, esophagus, or stomach    Infection    Increased gas or bloating    Bleeding    Not able to vomit    Trouble swallowing    Not able to get rid of GERD   Date Last Reviewed: 7/1/2016 2000-2018 [employer]. [address] [place] [address], [place] [address]. All rights reserved. This information is not intended as a substitute for professional medical care. Always follow your healthcare professional's instructions.

## 2019-02-15 DIAGNOSIS — K21.9 GASTROESOPHAGEAL REFLUX DISEASE WITHOUT ESOPHAGITIS: ICD-10-CM

## 2019-02-15 LAB — TSH SERPL DL<=0.005 MIU/L-ACNC: 1.73 MU/L (ref 0.4–4)

## 2019-02-15 PROCEDURE — 87338 HPYLORI STOOL AG IA: CPT | Performed by: INTERNAL MEDICINE

## 2019-02-15 NOTE — RESULT ENCOUNTER NOTE
Kelton Flores,    This is to inform you regarding your test result.    HbA1c which is average glucose during last 3 months is normal.      Sincerely,      Dr.Nasima Shannon MD,FACP

## 2019-02-16 DIAGNOSIS — E06.3 HASHIMOTO'S THYROIDITIS: ICD-10-CM

## 2019-02-16 RX ORDER — LEVOTHYROXINE SODIUM 125 UG/1
125 TABLET ORAL DAILY
Qty: 90 TABLET | Refills: 1 | Status: SHIPPED | OUTPATIENT
Start: 2019-02-16 | End: 2024-06-06

## 2019-02-17 NOTE — RESULT ENCOUNTER NOTE
Kelton Flores,    This is to inform you regarding your test result.    TSH which is thyroid hormone is normal.  Stay on current dose of levothyroxine.  Recheck TSH in 6 months  Script is [facility] to [facility]    Sincerely,      Dr.Nasima Shannon MD,FACP

## 2019-02-18 LAB
H PYLORI AG STL QL IA: NORMAL
SPECIMEN SOURCE: NORMAL

## 2019-02-19 NOTE — RESULT ENCOUNTER NOTE
Kelton Flores,    This is to inform you regarding your test result.    Your H.pylori tet is negative  If your symptoms persists then I recommend that you see GI doctor  Let me know if you need referral.    Sincerely,      Dr.Nasima Shannon MD,FACP

## 2020-02-16 ENCOUNTER — HEALTH MAINTENANCE LETTER (OUTPATIENT)
Age: 45
End: 2020-02-16

## 2020-11-29 ENCOUNTER — HEALTH MAINTENANCE LETTER (OUTPATIENT)
Age: 45
End: 2020-11-29

## 2021-04-10 ENCOUNTER — HEALTH MAINTENANCE LETTER (OUTPATIENT)
Age: 46
End: 2021-04-10

## 2021-09-25 ENCOUNTER — HEALTH MAINTENANCE LETTER (OUTPATIENT)
Age: 46
End: 2021-09-25

## 2022-01-15 ENCOUNTER — NURSE TRIAGE (OUTPATIENT)
Dept: NURSING | Facility: CLINIC | Age: 47
End: 2022-01-15
Payer: COMMERCIAL

## 2022-01-15 NOTE — TELEPHONE ENCOUNTER
Exposure to body fluids today.  Believes his skin is not intact because when he washed his hands he felt burning.  This was not work related.  Caller had already spoke to his pcp clinic and was told to go to an ER.    He called us.  [place] [facility]  I called to ask them if he can be seen there for this. It's not stated in the scope of practice.   Zhane answered. I was transferred to the nurses.  After ringing many times the call disconnected.  I called back.  It took many rings before call was answered.  At this time I asked Zhane to go back and talk with someone.  She did and transferred me to  provider Dr Wai Puente.  Dr Puente stated this is not something they do in UC.  ER was recommended.    I called patient back. He was argumentative though pleasant. He stated he is not in a position to go to ER. I told him though that there isn't any reason to go to this UC because he can't be helped there. He then stated understanding.    Meghan WILD RN [facility]                                 Reason for Disposition    EXPOSURE of non-intact skin (e.g., exposed person has dermatitis, abrasion, wound) and with blood or body fluid containing visible blood    Protocols used: BLOOD AND BODY FLUID EXPOSURE-A-OH

## 2022-05-01 ENCOUNTER — HEALTH MAINTENANCE LETTER (OUTPATIENT)
Age: 47
End: 2022-05-01

## 2022-12-25 ENCOUNTER — HEALTH MAINTENANCE LETTER (OUTPATIENT)
Age: 47
End: 2022-12-25

## 2023-06-02 ENCOUNTER — HEALTH MAINTENANCE LETTER (OUTPATIENT)
Age: 48
End: 2023-06-02

## 2024-06-06 ENCOUNTER — OFFICE VISIT (OUTPATIENT)
Dept: INTERNAL MEDICINE | Facility: CLINIC | Age: 49
End: 2024-06-06
Payer: COMMERCIAL

## 2024-06-06 VITALS
WEIGHT: 209 LBS | DIASTOLIC BLOOD PRESSURE: 79 MMHG | RESPIRATION RATE: 15 BRPM | SYSTOLIC BLOOD PRESSURE: 138 MMHG | OXYGEN SATURATION: 99 % | HEART RATE: 54 BPM | BODY MASS INDEX: 25.45 KG/M2 | HEIGHT: 76 IN | TEMPERATURE: 97.7 F

## 2024-06-06 DIAGNOSIS — B00.1 RECURRENT COLD SORES: ICD-10-CM

## 2024-06-06 DIAGNOSIS — Z20.2 STD EXPOSURE: ICD-10-CM

## 2024-06-06 DIAGNOSIS — E06.3 HASHIMOTO'S THYROIDITIS: Primary | ICD-10-CM

## 2024-06-06 DIAGNOSIS — B35.6 TINEA CRURIS: ICD-10-CM

## 2024-06-06 DIAGNOSIS — R10.13 EPIGASTRIC PAIN: ICD-10-CM

## 2024-06-06 DIAGNOSIS — K21.9 GASTROESOPHAGEAL REFLUX DISEASE WITHOUT ESOPHAGITIS: ICD-10-CM

## 2024-06-06 LAB — T PALLIDUM AB SER QL: NONREACTIVE

## 2024-06-06 PROCEDURE — 99204 OFFICE O/P NEW MOD 45 MIN: CPT | Performed by: INTERNAL MEDICINE

## 2024-06-06 PROCEDURE — 36415 COLL VENOUS BLD VENIPUNCTURE: CPT | Performed by: INTERNAL MEDICINE

## 2024-06-06 PROCEDURE — 87591 N.GONORRHOEAE DNA AMP PROB: CPT | Performed by: INTERNAL MEDICINE

## 2024-06-06 PROCEDURE — 86780 TREPONEMA PALLIDUM: CPT | Performed by: INTERNAL MEDICINE

## 2024-06-06 PROCEDURE — 87491 CHLMYD TRACH DNA AMP PROBE: CPT | Performed by: INTERNAL MEDICINE

## 2024-06-06 RX ORDER — LEVOTHYROXINE SODIUM 125 UG/1
125 TABLET ORAL DAILY
Qty: 90 TABLET | Refills: 3 | Status: SHIPPED | OUTPATIENT
Start: 2024-06-06

## 2024-06-06 RX ORDER — PRENATAL VIT 91/IRON/FOLIC/DHA 28-975-200
COMBINATION PACKAGE (EA) ORAL 2 TIMES DAILY
Qty: 30 G | Refills: 2 | Status: SHIPPED | OUTPATIENT
Start: 2024-06-06 | End: 2024-06-13

## 2024-06-06 RX ORDER — PANTOPRAZOLE SODIUM 40 MG/1
40 TABLET, DELAYED RELEASE ORAL DAILY
Qty: 90 TABLET | Refills: 3 | Status: SHIPPED | OUTPATIENT
Start: 2024-06-06

## 2024-06-06 RX ORDER — FLUCONAZOLE 100 MG/1
100 TABLET ORAL DAILY
Qty: 7 TABLET | Refills: 0 | Status: SHIPPED | OUTPATIENT
Start: 2024-06-06 | End: 2024-06-13

## 2024-06-06 RX ORDER — VALACYCLOVIR HYDROCHLORIDE 1 G/1
1000 TABLET, FILM COATED ORAL 2 TIMES DAILY
Qty: 12 TABLET | Refills: 3 | Status: SHIPPED | OUTPATIENT
Start: 2024-06-06 | End: 2024-06-12

## 2024-06-06 ASSESSMENT — PAIN SCALES - GENERAL: PAINLEVEL: EXTREME PAIN (8)

## 2024-06-06 NOTE — PROGRESS NOTES
ASSESSMENT AND PLAN:    1. Hashimoto's thyroiditis  Refilled.   - levothyroxine (SYNTHROID/LEVOTHROID) 125 MCG tablet; Take 1 tablet (125 mcg) by mouth daily  Dispense: 90 tablet; Refill: 3    2. Gastroesophageal reflux disease without esophagitis  Refilled.   - pantoprazole (PROTONIX) 40 MG EC tablet; Take 1 tablet (40 mg) by mouth daily Take 30-60 minutes before a meal.  Dispense: 90 tablet; Refill: 3    3. Recurrent cold sores  Refilled.   - valACYclovir (VALTREX) 1000 mg tablet; Take 1 tablet (1,000 mg) by mouth 2 times daily for 6 days  Dispense: 12 tablet; Refill: 3    4. Tinea cruris  Exam and history consistent with tinea cruris.  There may be an element of scrotal yeast. There is no history or exam to suggest urethritis.  Will treat empirically as:  - fluconazole (DIFLUCAN) 100 MG tablet; Take 1 tablet (100 mg) by mouth daily for 7 days  Dispense: 7 tablet; Refill: 0  - terbinafine (LAMISIL) 1 % external cream; Apply topically 2 times daily for 7 days For groin fungus  Dispense: 30 g; Refill: 2    7. STD exposure  He denies known exposure but wants evaluation.   - Treponema Abs w Reflex to RPR and Titer; Future  - Chlamydia & Gonorrhea by PCR, [facility] - [facility] Collect    Follow up next scheduled visit andprn.    CHIEF COMPLAINT:  Groin rash    HISTORY OF PRESENT ILLNESS:  Sandra Cates is a 48 year old male with a few weeks of itchy, irritating rash of groin and scrotum. No urethral symptoms or discharge. No known exposure to STD, otherwise feels well.  No history of STD    REVIEW OF SYSTEMS:   See HPI, all other systems on review are negative.    Past Medical History:   Diagnosis Date    Lactose intolerance     Thyroid disease      History   Smoking Status    Former    Types: Cigarettes   Smokeless Tobacco    Never     Family History   Problem Relation Age of Onset    Coronary Artery Disease Mother     Prostate Cancer Father      Past Surgical History:   Procedure Laterality Date    ESOPHAGOSCOPY,  "GASTROSCOPY, DUODENOSCOPY (EGD), COMBINED N/A 12/14/2018    Procedure: COMBINED ESOPHAGOSCOPY, GASTROSCOPY, DUODENOSCOPY (EGD), BIOPSY SINGLE OR MULTIPLE;  Surgeon: Delvin Douglas MD;  Location:  [facility]       Allergies   Allergen Reactions    Chicken-Derived Products (Egg) Other (See Comments)     Stomach pain. Denies anaphylactic reaction.    No Known Allergies     Dairy Products [Milk Protein] Hives and Rash     VITALS:  Vitals:    06/06/24 1008   BP: 138/79   BP Location: Left arm   Patient Position: Sitting   Cuff Size: Adult Large   Pulse: 54   Resp: 15   Temp: 97.7  F (36.5  C)   SpO2: 99%   Weight: 94.8 kg (209 lb)   Height: 1.93 m (6' 4\")     Estimated body mass index is 25.44 kg/m  as calculated from the following:    Height as of this encounter: 1.93 m (6' 4\").    Weight as of this encounter: 94.8 kg (209 lb).  Wt Readings from Last 3 Encounters:   06/06/24 94.8 kg (209 lb)   02/14/19 93.6 kg (206 lb 6.4 oz)   11/27/18 91.2 kg (201 lb)     PHYSICAL EXAM:  Constitutional:  In NAD, alert and oriented  : Normal testes and phallus, there is groin rash very suggestive of tinea, with scrotal involvement. Testes nad urethral are normal.    Current Outpatient Medications   Medication Sig Dispense Refill    fluconazole (DIFLUCAN) 100 MG tablet Take 1 tablet (100 mg) by mouth daily for 7 days 7 tablet 0    levothyroxine (SYNTHROID/LEVOTHROID) 125 MCG tablet Take 1 tablet (125 mcg) by mouth daily 90 tablet 3    Multiple Vitamins-Minerals (MULTIVITAMIN ADULT PO)       pantoprazole (PROTONIX) 40 MG EC tablet Take 1 tablet (40 mg) by mouth daily Take 30-60 minutes before a meal. 90 tablet 3    terbinafine (LAMISIL) 1 % external cream Apply topically 2 times daily for 7 days For groin fungus 30 g 2    valACYclovir (VALTREX) 1000 mg tablet Take 1 tablet (1,000 mg) by mouth 2 times daily for 6 days 12 tablet 3     Trace Thomas MD  Internal Medicine  [facility]   "

## 2024-06-07 LAB
C TRACH DNA SPEC QL PROBE+SIG AMP: NEGATIVE
N GONORRHOEA DNA SPEC QL NAA+PROBE: NEGATIVE

## 2024-06-12 ENCOUNTER — TELEPHONE (OUTPATIENT)
Dept: DERMATOLOGY | Facility: CLINIC | Age: 49
End: 2024-06-12

## 2024-06-12 ENCOUNTER — OFFICE VISIT (OUTPATIENT)
Dept: INTERNAL MEDICINE | Facility: CLINIC | Age: 49
End: 2024-06-12
Payer: COMMERCIAL

## 2024-06-12 VITALS
HEIGHT: 76 IN | TEMPERATURE: 97.7 F | HEART RATE: 62 BPM | OXYGEN SATURATION: 99 % | WEIGHT: 209 LBS | SYSTOLIC BLOOD PRESSURE: 128 MMHG | DIASTOLIC BLOOD PRESSURE: 84 MMHG | BODY MASS INDEX: 25.45 KG/M2 | RESPIRATION RATE: 15 BRPM

## 2024-06-12 DIAGNOSIS — L30.9 DERMATITIS: Primary | ICD-10-CM

## 2024-06-12 PROCEDURE — 99213 OFFICE O/P EST LOW 20 MIN: CPT | Performed by: INTERNAL MEDICINE

## 2024-06-12 ASSESSMENT — PAIN SCALES - GENERAL: PAINLEVEL: NO PAIN (0)

## 2024-06-12 NOTE — PROGRESS NOTES
" ASSESSMENT AND PLAN:    1. Dermatitis  His groin rash has not responded to topical terbinafine with oral fluconazole.  Appearance is still suggestive of tinea.  He can use 1% HC cream for the itching, and is referred to dermatology.   - Adult Dermatology  Referral; Future    Follow up if fails to be improved.     CHIEF COMPLAINT:  Skin rash    HISTORY OF PRESENT ILLNESS:  Sandra Cates is a 48 year old male with continued itchy rash in his groin, both sides and on scrotum and penis.  No dysuria, or frequency.  No overt pain.  The treatment I gave him did not work.     Past Medical History:   Diagnosis Date    Lactose intolerance     Thyroid disease      History   Smoking Status    Former    Types: Cigarettes   Smokeless Tobacco    Never     Family History   Problem Relation Age of Onset    Coronary Artery Disease Mother     Prostate Cancer Father      Past Surgical History:   Procedure Laterality Date    ESOPHAGOSCOPY, GASTROSCOPY, DUODENOSCOPY (EGD), COMBINED N/A 12/14/2018    Procedure: COMBINED ESOPHAGOSCOPY, GASTROSCOPY, DUODENOSCOPY (EGD), BIOPSY SINGLE OR MULTIPLE;  Surgeon: Delvin Douglas MD;  Location:  GI    [facility]       Allergies   Allergen Reactions    Chicken-Derived Products (Egg) Other (See Comments)     Stomach pain. Denies anaphylactic reaction.    No Known Allergies     Dairy Products [Milk Protein] Hives and Rash     VITALS:  Vitals:    06/12/24 1226   BP: 128/84   BP Location: Left arm   Patient Position: Sitting   Cuff Size: Adult Large   Pulse: 62   Resp: 15   Temp: 97.7  F (36.5  C)   SpO2: 99%   Weight: 94.8 kg (209 lb)   Height: 1.93 m (6' 4\")     Estimated body mass index is 25.44 kg/m  as calculated from the following:    Height as of this encounter: 1.93 m (6' 4\").    Weight as of this encounter: 94.8 kg (209 lb).  Wt Readings from Last 3 Encounters:   06/12/24 94.8 kg (209 lb)   06/06/24 94.8 kg (209 lb)   02/14/19 93.6 kg (206 lb 6.4 oz)     PHYSICAL " EXAM:  Constitutional:  In NAD, alert and oriented  : there is a fungal looking bilateral groin rash, with variegated borders,  the scrotum looks overall normal. There is some dryness on the undersurface of the penis.  No lesions, ulcers, or cellulitis or other abnormalities are noted.     Current Outpatient Medications   Medication Sig Dispense Refill    fluconazole (DIFLUCAN) 100 MG tablet Take 1 tablet (100 mg) by mouth daily for 7 days 7 tablet 0    levothyroxine (SYNTHROID/LEVOTHROID) 125 MCG tablet Take 1 tablet (125 mcg) by mouth daily 90 tablet 3    terbinafine (LAMISIL) 1 % external cream Apply topically 2 times daily for 7 days For groin fungus 30 g 2    valACYclovir (VALTREX) 1000 mg tablet Take 1 tablet (1,000 mg) by mouth 2 times daily for 6 days 12 tablet 3    Multiple Vitamins-Minerals (MULTIVITAMIN ADULT PO)  (Patient not taking: Reported on 6/12/2024)      pantoprazole (PROTONIX) 40 MG EC tablet Take 1 tablet (40 mg) by mouth daily Take 30-60 minutes before a meal. (Patient not taking: Reported on 6/12/2024) 90 tablet 3     Trace Thomas MD  Internal Medicine  [facility]

## 2024-06-12 NOTE — TELEPHONE ENCOUNTER
S/w pt and scheduled with Marisol Kidd on Monday June 17th at 1:45 pm at [facility].    Myranda CASTRO RN  [facility]  [phone]

## 2024-06-12 NOTE — TELEPHONE ENCOUNTER
This encounter is being sent to inform the clinic that this patient has a referral from Trace Collins for the diagnoses of Dermatitis and has requested that this patient be seen within 1-2 Weeks and/or with OX/ EC .  Based on the availability of our provider(s), we are unable to accommodate this request.    Were all sites offered this patient?  Yes    Does scheduling algorithm request to schedule next available?  Patient has been scheduled for the first available opening with Dr. Romero on 2/17.  We have informed the patient that the clinic will review their referral and reach out if a sooner appointment is medically necessary.

## 2024-06-17 ENCOUNTER — OFFICE VISIT (OUTPATIENT)
Dept: FAMILY MEDICINE | Facility: CLINIC | Age: 49
End: 2024-06-17
Payer: COMMERCIAL

## 2024-06-17 DIAGNOSIS — L30.9 DERMATITIS: ICD-10-CM

## 2024-06-17 PROCEDURE — 99203 OFFICE O/P NEW LOW 30 MIN: CPT | Performed by: PHYSICIAN ASSISTANT

## 2024-06-17 RX ORDER — TACROLIMUS 1 MG/G
OINTMENT TOPICAL 2 TIMES DAILY
Qty: 30 G | Refills: 1 | Status: SHIPPED | OUTPATIENT
Start: 2024-06-17 | End: 2024-08-26

## 2024-06-17 RX ORDER — MOMETASONE FUROATE 1 MG/G
CREAM TOPICAL DAILY
Qty: 15 G | Refills: 2 | Status: SHIPPED | OUTPATIENT
Start: 2024-06-17 | End: 2024-08-26

## 2024-06-17 ASSESSMENT — PAIN SCALES - GENERAL: PAINLEVEL: NO PAIN (0)

## 2024-06-17 NOTE — LETTER
6/17/2024      Sandra Cates  [address] [facility] [place] [address]      Dear Colleague,    Thank you for referring your patient, Sandra Cates, to the [facility]. Please see a copy of my visit note below.    [facility] Dermatology Note  Encounter Date: Jun 17, 2024  Office Visit      Dermatology Problem List:    Dermatitis vs PSO - external genitalia  - Tx: Tacrolimus ointment and Mometasone cream  - past: failed: topical terbinafine as well as oral fluconazole    Social:  for 3M  PMHx: Hypothyroidism  ____________________________________________    Assessment & Plan:  # Dermatitis vs PSO - also considered contact dermatitis. Patient is sexually active, will speak with wife and find out if any new products have been used that perhaps could have come in contact with groin/genitals which could have initiated this.   - Discussed the possible etiology of this condition as well as treatment options and their side effects.   - Start on tacrolimus ointment to apply topically twice daily.   - Start on mometasone cream to apply topically daily.   - alternate mometasone and tacrolimus, steroid edu given  - Follow up in 3 months if there is no improvement.   - If no improvement, will consider punch biopsy     Procedures Performed:   None     Follow-up: 3 month(s) in-person, or earlier for new or changing lesions    Staff and scribe:    Scribe Disclosure:   I, HE DAVID, am serving as a scribe; to document services personally performed by Marisol Kidd PA-C -based on data collection and the provider's statements to me.     Provider Disclosure:  I agree with above History, Review of Systems, Physical exam and Plan.  I have reviewed the content of the documentation and have edited it as needed. I have personally performed the services documented here and the documentation accurately represents those services and the decisions I have made.       Electronically signed by    All risks, benefits and alternatives were discussed with patient.  Patient is in agreement and understands the assessment and plan.  All questions were answered.    Marisol Kidd PA-C, MPAS  [facility]: Phone: [phone], Fax: [phone]  [facility]: Phone: [phone],  Fax: [phone]  [facility]: Phone: [phone], Fax: [phone]  ____________________________________________    CC: Derm Problem (Dry itchy painful skin in groin area present over the chavez month, currently using a barrier spray which is helping some)      Reviewed patients past medical history and pertinent chart review prior to patient's visit today.     HPI:  Mr. Sandra Cates is a 48 year old male who presents today as a new patient for rash evaluation.     Today patient reported a dry, itchy, painful, rash in his groin area. Has been present over the last month.     Patient reports that he is currently using a Cavilon barrier spray that has been some what helpful.     Terbinafine and fluconazole has not been helpful.    Patient is otherwise feeling well, without additional concerns.    Labs:  N/A    Physical Exam:  Vitals: There were no vitals taken for this visit.  SKIN: Focused examination of groin area was performed.   - inguinal folds clear  - erythema and thin scaling on the scrotum and along the penile shaft  - small fissure on the corona of the penis   - No other lesions of concern on areas examined.     Medications:  Current Outpatient Medications   Medication Sig Dispense Refill     levothyroxine (SYNTHROID/LEVOTHROID) 125 MCG tablet Take 1 tablet (125 mcg) by mouth daily 90 tablet 3     Multiple Vitamins-Minerals (MULTIVITAMIN ADULT PO)  (Patient not taking: Reported on 6/12/2024)       pantoprazole (PROTONIX) 40 MG EC tablet Take 1 tablet (40 mg) by mouth daily Take 30-60  minutes before a meal. (Patient not taking: Reported on 6/12/2024) 90 tablet 3     valACYclovir (VALTREX) 1000 mg tablet Take 1 tablet (1,000 mg) by mouth 2 times daily for 6 days 12 tablet 3     No current facility-administered medications for this visit.      Past Medical/Surgical History:   Patient Active Problem List   Diagnosis     CARDIOVASCULAR SCREENING; LDL GOAL LESS THAN 160     Lactose intolerance     Elevated liver enzymes     Hashimoto's thyroiditis     FH: prostate cancer     Tear of medial cartilage or meniscus of knee, current     Anti-TPO antibodies present     Hypothyroidism     Past Medical History:   Diagnosis Date     Lactose intolerance      Thyroid disease                         Again, thank you for allowing me to participate in the care of your patient.        Sincerely,        Marisol Kidd PA-C

## 2024-06-17 NOTE — PROGRESS NOTES
[facility] Dermatology Note  Encounter Date: Jun 17, 2024  Office Visit      Dermatology Problem List:    Dermatitis vs PSO - external genitalia  - Tx: Tacrolimus ointment and Mometasone cream  - past: failed: topical terbinafine as well as oral fluconazole    Social:  for   PMHx: Hypothyroidism  ____________________________________________    Assessment & Plan:  # Dermatitis vs PSO - also considered contact dermatitis. Patient is sexually active, will speak with wife and find out if any new products have been used that perhaps could have come in contact with groin/genitals which could have initiated this.   - Discussed the possible etiology of this condition as well as treatment options and their side effects.   - Start on tacrolimus ointment to apply topically twice daily.   - Start on mometasone cream to apply topically daily.   - alternate mometasone and tacrolimus, steroid edu given  - Follow up in 3 months if there is no improvement.   - If no improvement, will consider punch biopsy     Procedures Performed:   None     Follow-up: 3 month(s) in-person, or earlier for new or changing lesions    Staff and scribe:    Scribe Disclosure:   I, HE DAVID, am serving as a scribe; to document services personally performed by Marisol Kidd PA-C -based on data collection and the provider's statements to me.     Provider Disclosure:  I agree with above History, Review of Systems, Physical exam and Plan.  I have reviewed the content of the documentation and have edited it as needed. I have personally performed the services documented here and the documentation accurately represents those services and the decisions I have made.      Electronically signed by    All risks, benefits and alternatives were discussed with patient.  Patient is in agreement and understands the assessment and plan.  All questions were answered.    Marisol Kidd PA-C, MPAS  [facility]  [facility]: Phone: [phone], Fax: [phone]  [facility]: Phone: [phone],  Fax: [phone]  [facility]: Phone: [phone], Fax: [phone]  ____________________________________________    CC: Derm Problem (Dry itchy painful skin in groin area present over the chavez month, currently using a barrier spray which is helping some)      Reviewed patients past medical history and pertinent chart review prior to patient's visit today.     HPI:  Mr. Sandra Cates is a 48 year old male who presents today as a new patient for rash evaluation.     Today patient reported a dry, itchy, painful, rash in his groin area. Has been present over the last month.     Patient reports that he is currently using a Cavilon barrier spray that has been some what helpful.     Terbinafine and fluconazole has not been helpful.    Patient is otherwise feeling well, without additional concerns.    Labs:  N/A    Physical Exam:  Vitals: There were no vitals taken for this visit.  SKIN: Focused examination of groin area was performed.   - inguinal folds clear  - erythema and thin scaling on the scrotum and along the penile shaft  - small fissure on the corona of the penis   - No other lesions of concern on areas examined.     Medications:  Current Outpatient Medications   Medication Sig Dispense Refill    levothyroxine (SYNTHROID/LEVOTHROID) 125 MCG tablet Take 1 tablet (125 mcg) by mouth daily 90 tablet 3    Multiple Vitamins-Minerals (MULTIVITAMIN ADULT PO)  (Patient not taking: Reported on 6/12/2024)      pantoprazole (PROTONIX) 40 MG EC tablet Take 1 tablet (40 mg) by mouth daily Take 30-60 minutes before a meal. (Patient not taking: Reported on 6/12/2024) 90 tablet 3    valACYclovir (VALTREX) 1000 mg tablet Take 1 tablet (1,000 mg) by mouth 2 times daily for 6 days 12 tablet 3     No current facility-administered medications for this visit.       Past Medical/Surgical History:   Patient Active Problem List   Diagnosis    CARDIOVASCULAR SCREENING; LDL GOAL LESS THAN 160    Lactose intolerance    Elevated liver enzymes    Hashimoto's thyroiditis    FH: prostate cancer    Tear of medial cartilage or meniscus of knee, current    Anti-TPO antibodies present    Hypothyroidism     Past Medical History:   Diagnosis Date    Lactose intolerance     Thyroid disease

## 2024-06-23 ENCOUNTER — HEALTH MAINTENANCE LETTER (OUTPATIENT)
Age: 49
End: 2024-06-23

## 2024-08-26 ENCOUNTER — OFFICE VISIT (OUTPATIENT)
Dept: DERMATOLOGY | Facility: CLINIC | Age: 49
End: 2024-08-26
Payer: COMMERCIAL

## 2024-08-26 DIAGNOSIS — L30.9 DERMATITIS: ICD-10-CM

## 2024-08-26 PROCEDURE — 99213 OFFICE O/P EST LOW 20 MIN: CPT | Performed by: PHYSICIAN ASSISTANT

## 2024-08-26 RX ORDER — TACROLIMUS 1 MG/G
OINTMENT TOPICAL 2 TIMES DAILY
Qty: 30 G | Refills: 1 | Status: SHIPPED | OUTPATIENT
Start: 2024-08-26

## 2024-08-26 RX ORDER — MOMETASONE FUROATE 1 MG/G
CREAM TOPICAL DAILY
Qty: 15 G | Refills: 2 | Status: SHIPPED | OUTPATIENT
Start: 2024-08-26

## 2024-08-26 NOTE — LETTER
8/26/2024      Sandra Cates  [id number] [address] [facility] [address]      Dear Colleague,    Thank you for referring your patient, Sandra Cates, to the [facility]. Please see a copy of my visit note below.    [facility] Dermatology Note  Encounter Date: Aug 26, 2024  Office Visit      Dermatology Problem List:    Dermatitis vs PSO - external genitalia (IF RASH TO RETURN OK TO ADD ON FOR BIOPSY)  - Tx: Tacrolimus ointment and Mometasone cream  - past: failed: topical terbinafine as well as oral fluconazole    Social:  for   PMHx: Hypothyroidism  ____________________________________________    Assessment & Plan:  # Dermatitis vs PSO - No rash present at this time. Resolved with tacrolimus/mometasone. Now occasionally gets itchy, but when he uses topicals this sx resolves. Patient is very determined to get to final diagnosis. Explained to him that best way to do this is to biopsy the rash, however since it is not present at this time a biopsy is not particularly helpful.   - Discussed the possible etiology of this condition (PSO vs dermatitis) as well as treatment options and their side effects.   - Continue on tacrolimus ointment to apply topically twice daily for pruritic or rash flares.   - Continue on mometasone cream to apply topically daily as needed. Refilled rx.   - alternate mometasone and tacrolimus, steroid edu given  - unclear at this time if patient will need to use creams long term or if pruritus in area will stop and he can hold creams.  - discussed that patient can elect to hold creams and see if rash comes back/worsens and then at that time we could biopsy it.   - advised moisturizer daily    Procedures Performed:   None     Follow-up: 3 month(s) in-person, or earlier for new or changing lesions    Staff and scribe:    Scribe Disclosure:   HE MIGUEL, am serving as a scribe; to document services personally  performed by Marisol Kidd PA-C -based on data collection and the provider's statements to me.     Provider Disclosure:  I agree with above History, Review of Systems, Physical exam and Plan.  I have reviewed the content of the documentation and have edited it as needed. I have personally performed the services documented here and the documentation accurately represents those services and the decisions I have made.      Electronically signed by    All risks, benefits and alternatives were discussed with patient.  Patient is in agreement and understands the assessment and plan.  All questions were answered.    Marisol Kidd PA-C, [facility]  [facility]: Phone: [phone], Fax: [phone]  [facility]: Phone: [phone],  Fax: [phone]  [facility]: Phone: [phone], Fax: [phone]  ____________________________________________    CC: Rash (Rash groin x 4- 6 weeks)      Reviewed patients past medical history and pertinent chart review prior to patient's visit today.     HPI:  Mr. Sandra Cates is a 48 year old male who presents today as a return patient for a rash follow up.    Patient reports that rash resolved within 2-3 days of use of mometasone/tacrolimus. Occasionally the itchiness returns. He uses the topicals and it resolves within a few applications.  He notes that rash resolved fairly quickly after starting use of cream. Patient having difficulty with not knowing exact etiology of this condition.     Patient is otherwise feeling well, without additional concerns.    Labs:  N/A    Physical Exam:  Vitals: There were no vitals taken for this visit.  SKIN: Focused examination of groin area was performed.   - No rash present today, mild hyperpigmentation of the groin. No scale.  - No other lesions of concern on areas examined.     Medications:  Current Outpatient Medications   Medication  Sig Dispense Refill     levothyroxine (SYNTHROID/LEVOTHROID) 125 MCG tablet Take 1 tablet (125 mcg) by mouth daily 90 tablet 3     mometasone (ELOCON) 0.1 % external cream Apply topically daily 15 g 2     Multiple Vitamins-Minerals (MULTIVITAMIN ADULT PO) Take by mouth.       pantoprazole (PROTONIX) 40 MG EC tablet Take 1 tablet (40 mg) by mouth daily Take 30-60 minutes before a meal. 90 tablet 3     tacrolimus (PROTOPIC) 0.1 % external ointment Apply topically 2 times daily 30 g 1     valACYclovir (VALTREX) 1000 mg tablet Take 1 tablet (1,000 mg) by mouth 2 times daily for 6 days 12 tablet 3     No current facility-administered medications for this visit.      Past Medical/Surgical History:   Patient Active Problem List   Diagnosis     CARDIOVASCULAR SCREENING; LDL GOAL LESS THAN 160     Lactose intolerance     Elevated liver enzymes     Hashimoto's thyroiditis     FH: prostate cancer     Tear of medial cartilage or meniscus of knee, current     Anti-TPO antibodies present     Hypothyroidism     Past Medical History:   Diagnosis Date     Lactose intolerance      Thyroid disease                         Again, thank you for allowing me to participate in the care of your patient.        Sincerely,        Marisol Kidd PA-C

## 2024-08-26 NOTE — PATIENT INSTRUCTIONS
Proper skin care from [facility]:    -Eliminate harsh soaps as they strip the natural oils from the skin, often resulting in dry itchy skin ( i.e. Dial, Zest, Prydeinig Spring)  -Use mild soaps such as Cetaphil or Dove Sensitive Skin in the shower. You do not need to use soap on arms, legs, and trunk every time you shower unless visibly soiled.   -Avoid hot or cold showers.  -After showering, lightly dry off and apply moisturizing within 2-3 minutes. This will help trap moisture in the skin.   -Aggressive use of a moisturizer at least 1-2 times a day to the entire body (including -Vanicream, Cetaphil, Aquaphor or Cerave) and moisturize hands after every washing.  -We recommend using moisturizers that come in a tub that needs to be scooped out, not a pump. This has more of an oil base. It will hold moisture in your skin much better than a water base moisturizer. The above recommended are non-pore clogging.      Wear a sunscreen with at least SPF 30 on your face, ears, neck and V of the chest daily. Wear sunscreen on other areas of the body if those areas are exposed to the sun throughout the day. Sunscreens can contain physical and/or chemical blockers. Physical blockers are less likely to clog pores, these include zinc oxide and titanium dioxide. Reapply every two hour and after swimming.     Sunscreen examples: https://www.ewg.org/sunscreen/    UV radiation  UVA radiation remains constant throughout the day and throughout the year. It is a longer wavelength than UVB and therefore penetrates deeper into the skin leading to immediate and delayed tanning, photoaging, and skin cancer. 70-80% of UVA and UVB radiation occurs between the hours of 10am-2pm.  UVB radiation  UVB radiation causes the most harmful effects and is more significant during the summer months. However, snow and ice can reflect UVB radiation leading to skin damage during the winter months as well. UVB radiation is responsible for tanning,  burning, inflammation, delayed erythema (pinkness), pigmentation (brown spots), and skin cancer.     I recommend self monthly full body exams and yearly full body exams with a dermatology provider. If you develop a new or changing lesion please follow up for examination. Most skin cancers are pink and scaly or pink and pearly. However, we do see blue/brown/black skin cancers.  Consider the ABCDEs of melanoma when giving yourself your monthly full body exam ( don't forget the groin, buttocks, feet, toes, etc). A-asymmetry, B-borders, C-color, D-diameter, E-elevation or evolving. If you see any of these changes please follow up in clinic. If you cannot see your back I recommend purchasing a hand held mirror to use with a larger wall mirror.       Checking for Skin Cancer  You can find cancer early by checking your skin each month. There are 3 kinds of skin cancer. They are melanoma, basal cell carcinoma, and squamous cell carcinoma. Doing monthly skin checks is the best way to find new marks or skin changes. Follow the instructions below for checking your skin.   The ABCDEs of checking moles for melanoma   Check your moles or growths for signs of melanoma using ABCDE:   Asymmetry: the sides of the mole or growth don t match  Border: the edges are ragged, notched, or blurred  Color: the color within the mole or growth varies  Diameter: the mole or growth is larger than 6 mm (size of a pencil eraser)  Evolving: the size, shape, or color of the mole or growth is changing (evolving is not shown in the images below)    Checking for other types of skin cancer  Basal cell carcinoma or squamous cell carcinoma have symptoms such as:     A spot or mole that looks different from all other marks on your skin  Changes in how an area feels, such as itching, tenderness, or pain  Changes in the skin's surface, such as oozing, bleeding, or scaliness  A sore that does not heal  New swelling or redness beyond the border of a  mole    Who s at risk?  Anyone can get skin cancer. But you are at greater risk if you have:   Fair skin, light-colored hair, or light-colored eyes  Many moles or abnormal moles on your skin  A history of sunburns from sunlight or tanning beds  A family history of skin cancer  A history of exposure to radiation or chemicals  A weakened immune system  If you have had skin cancer in the past, you are at risk for recurring skin cancer.   How to check your skin  Do your monthly skin checkups in front of a full-length mirror. Check all parts of your body, including your:   Head (ears, face, neck, and scalp)  Torso (front, back, and sides)  Arms (tops, undersides, upper, and lower armpits)  Hands (palms, backs, and fingers, including under the nails)  Buttocks and genitals  Legs (front, back, and sides)  Feet (tops, soles, toes, including under the nails, and between toes)  If you have a lot of moles, take digital photos of them each month. Make sure to take photos both up close and from a distance. These can help you see if any moles change over time.   Most skin changes are not cancer. But if you see any changes in your skin, call your doctor right away. Only he or she can diagnose a problem. If you have skin cancer, seeing your doctor can be the first step toward getting the treatment that could save your life.   Z[employer] last reviewed this educational content on 4/1/2019 2000-2020 [employer]. [address] [place] [address], [place] [address]. All rights reserved. This information is not intended as a substitute for professional medical care. Always follow your healthcare professional's instructions.       When should I call my doctor?  If you are worsening or not improving, please, contact us or seek urgent care as noted below.     Who should I call with questions (adults)?    [facility] [phone]  For urgent needs outside of business hours call the [facility] at  [phone] and ask for the dermatology resident on call to be paged  If this is a medical emergency and you are unable to reach an ER, Call 911      If you need a prescription refill, please contact your pharmacy. Refills are approved or denied by our Physicians during normal business hours, Monday through Fridays  Per office policy, refills will not be granted if you have not been seen within the past year (or sooner depending on your child's condition)

## 2024-08-26 NOTE — PROGRESS NOTES
[facility] Dermatology Note  Encounter Date: Aug 26, 2024  Office Visit      Dermatology Problem List:    Dermatitis vs PSO - external genitalia (IF RASH TO RETURN OK TO ADD ON FOR BIOPSY)  - Tx: Tacrolimus ointment and Mometasone cream  - past: failed: topical terbinafine as well as oral fluconazole    Social:  for 3M  PMHx: Hypothyroidism  ____________________________________________    Assessment & Plan:  # Dermatitis vs PSO - No rash present at this time. Resolved with tacrolimus/mometasone. Now occasionally gets itchy, but when he uses topicals this sx resolves. Patient is very determined to get to final diagnosis. Explained to him that best way to do this is to biopsy the rash, however since it is not present at this time a biopsy is not particularly helpful.   - Discussed the possible etiology of this condition (PSO vs dermatitis) as well as treatment options and their side effects.   - Continue on tacrolimus ointment to apply topically twice daily for pruritic or rash flares.   - Continue on mometasone cream to apply topically daily as needed. Refilled rx.   - alternate mometasone and tacrolimus, steroid edu given  - unclear at this time if patient will need to use creams long term or if pruritus in area will stop and he can hold creams.  - discussed that patient can elect to hold creams and see if rash comes back/worsens and then at that time we could biopsy it.   - advised moisturizer daily    Procedures Performed:   None     Follow-up: 3 month(s) in-person, or earlier for new or changing lesions    Staff and scribe:    Scribe Disclosure:   I, HE DAVID, am serving as a scribe; to document services personally performed by Marisol Kidd PA-C -based on data collection and the provider's statements to me.     Provider Disclosure:  I agree with above History, Review of Systems, Physical exam and Plan.  I have reviewed the content of the documentation and have edited  it as needed. I have personally performed the services documented here and the documentation accurately represents those services and the decisions I have made.      Electronically signed by    All risks, benefits and alternatives were discussed with patient.  Patient is in agreement and understands the assessment and plan.  All questions were answered.    Marisol Kidd PA-C, MPAS  [facility]: Phone: [phone], Fax: [phone]  [facility]: Phone: [phone],  Fax: [phone]  [facility]: Phone: [phone], Fax: [phone]  ____________________________________________    CC: Rash (Rash groin x 4- 6 weeks)      Reviewed patients past medical history and pertinent chart review prior to patient's visit today.     HPI:  Mr. Sandra Cates is a 48 year old male who presents today as a return patient for a rash follow up.    Patient reports that rash resolved within 2-3 days of use of mometasone/tacrolimus. Occasionally the itchiness returns. He uses the topicals and it resolves within a few applications.  He notes that rash resolved fairly quickly after starting use of cream. Patient having difficulty with not knowing exact etiology of this condition.     Patient is otherwise feeling well, without additional concerns.    Labs:  N/A    Physical Exam:  Vitals: There were no vitals taken for this visit.  SKIN: Focused examination of groin area was performed.   - No rash present today, mild hyperpigmentation of the groin. No scale.  - No other lesions of concern on areas examined.     Medications:  Current Outpatient Medications   Medication Sig Dispense Refill    levothyroxine (SYNTHROID/LEVOTHROID) 125 MCG tablet Take 1 tablet (125 mcg) by mouth daily 90 tablet 3    mometasone (ELOCON) 0.1 % external cream Apply topically daily 15 g 2    Multiple Vitamins-Minerals (MULTIVITAMIN ADULT PO) Take by  mouth.      pantoprazole (PROTONIX) 40 MG EC tablet Take 1 tablet (40 mg) by mouth daily Take 30-60 minutes before a meal. 90 tablet 3    tacrolimus (PROTOPIC) 0.1 % external ointment Apply topically 2 times daily 30 g 1    valACYclovir (VALTREX) 1000 mg tablet Take 1 tablet (1,000 mg) by mouth 2 times daily for 6 days 12 tablet 3     No current facility-administered medications for this visit.      Past Medical/Surgical History:   Patient Active Problem List   Diagnosis    CARDIOVASCULAR SCREENING; LDL GOAL LESS THAN 160    Lactose intolerance    Elevated liver enzymes    Hashimoto's thyroiditis    FH: prostate cancer    Tear of medial cartilage or meniscus of knee, current    Anti-TPO antibodies present    Hypothyroidism     Past Medical History:   Diagnosis Date    Lactose intolerance     Thyroid disease

## 2025-07-12 ENCOUNTER — HEALTH MAINTENANCE LETTER (OUTPATIENT)
Age: 50
End: 2025-07-12

## (undated) RX ORDER — FENTANYL CITRATE 50 UG/ML
INJECTION, SOLUTION INTRAMUSCULAR; INTRAVENOUS
Status: DISPENSED
Start: 2018-12-14